# Patient Record
Sex: FEMALE | Race: WHITE | NOT HISPANIC OR LATINO
[De-identification: names, ages, dates, MRNs, and addresses within clinical notes are randomized per-mention and may not be internally consistent; named-entity substitution may affect disease eponyms.]

---

## 2017-01-04 ENCOUNTER — TRANSCRIPTION ENCOUNTER (OUTPATIENT)
Age: 57
End: 2017-01-04

## 2017-01-09 ENCOUNTER — RX RENEWAL (OUTPATIENT)
Age: 57
End: 2017-01-09

## 2017-03-27 ENCOUNTER — APPOINTMENT (OUTPATIENT)
Dept: ORTHOPEDIC SURGERY | Facility: CLINIC | Age: 57
End: 2017-03-27

## 2017-03-28 ENCOUNTER — TRANSCRIPTION ENCOUNTER (OUTPATIENT)
Age: 57
End: 2017-03-28

## 2018-05-08 ENCOUNTER — APPOINTMENT (OUTPATIENT)
Dept: INTERNAL MEDICINE | Facility: CLINIC | Age: 58
End: 2018-05-08
Payer: COMMERCIAL

## 2018-05-08 VITALS
BODY MASS INDEX: 25.44 KG/M2 | OXYGEN SATURATION: 98 % | WEIGHT: 149 LBS | HEART RATE: 74 BPM | DIASTOLIC BLOOD PRESSURE: 70 MMHG | HEIGHT: 64 IN | SYSTOLIC BLOOD PRESSURE: 114 MMHG

## 2018-05-08 DIAGNOSIS — Z86.73 PERSONAL HISTORY OF TRANSIENT ISCHEMIC ATTACK (TIA), AND CEREBRAL INFARCTION W/OUT RESIDUAL DEFICITS: ICD-10-CM

## 2018-05-08 DIAGNOSIS — D68.59 OTHER PRIMARY THROMBOPHILIA: ICD-10-CM

## 2018-05-08 DIAGNOSIS — G56.02 CARPAL TUNNEL SYNDROME, LEFT UPPER LIMB: ICD-10-CM

## 2018-05-08 DIAGNOSIS — Z87.19 PERSONAL HISTORY OF OTHER DISEASES OF THE DIGESTIVE SYSTEM: ICD-10-CM

## 2018-05-08 DIAGNOSIS — G56.01 CARPAL TUNNEL SYNDROME, RIGHT UPPER LIMB: ICD-10-CM

## 2018-05-08 DIAGNOSIS — G56.21 LESION OF ULNAR NERVE, RIGHT UPPER LIMB: ICD-10-CM

## 2018-05-08 DIAGNOSIS — G44.209 TENSION-TYPE HEADACHE, UNSPECIFIED, NOT INTRACTABLE: ICD-10-CM

## 2018-05-08 DIAGNOSIS — Z87.898 PERSONAL HISTORY OF OTHER SPECIFIED CONDITIONS: ICD-10-CM

## 2018-05-08 DIAGNOSIS — M72.0 PALMAR FASCIAL FIBROMATOSIS [DUPUYTREN]: ICD-10-CM

## 2018-05-08 PROCEDURE — 99386 PREV VISIT NEW AGE 40-64: CPT

## 2018-05-08 RX ORDER — ALPRAZOLAM 0.5 MG/1
0.5 TABLET ORAL
Refills: 0 | Status: ACTIVE | COMMUNITY

## 2018-05-08 RX ORDER — SERTRALINE HYDROCHLORIDE 100 MG/1
100 TABLET, FILM COATED ORAL
Qty: 30 | Refills: 0 | Status: DISCONTINUED | COMMUNITY
Start: 2017-12-12 | End: 2018-05-08

## 2018-05-30 LAB — HEMOCCULT STL QL IA: NEGATIVE

## 2018-07-09 ENCOUNTER — APPOINTMENT (OUTPATIENT)
Dept: ENDOCRINOLOGY | Facility: CLINIC | Age: 58
End: 2018-07-09
Payer: COMMERCIAL

## 2018-07-09 VITALS — SYSTOLIC BLOOD PRESSURE: 114 MMHG | DIASTOLIC BLOOD PRESSURE: 72 MMHG | HEART RATE: 73 BPM | OXYGEN SATURATION: 98 %

## 2018-07-09 VITALS — WEIGHT: 145 LBS | BODY MASS INDEX: 24.75 KG/M2 | HEIGHT: 64 IN

## 2018-07-09 PROCEDURE — 99245 OFF/OP CONSLTJ NEW/EST HI 55: CPT

## 2018-07-25 ENCOUNTER — RX RENEWAL (OUTPATIENT)
Age: 58
End: 2018-07-25

## 2018-07-25 ENCOUNTER — OTHER (OUTPATIENT)
Age: 58
End: 2018-07-25

## 2018-08-09 ENCOUNTER — APPOINTMENT (OUTPATIENT)
Dept: INTERNAL MEDICINE | Facility: CLINIC | Age: 58
End: 2018-08-09
Payer: COMMERCIAL

## 2018-08-09 VITALS
BODY MASS INDEX: 25.61 KG/M2 | HEART RATE: 74 BPM | SYSTOLIC BLOOD PRESSURE: 110 MMHG | WEIGHT: 150 LBS | OXYGEN SATURATION: 98 % | HEIGHT: 64 IN | DIASTOLIC BLOOD PRESSURE: 66 MMHG

## 2018-08-09 PROCEDURE — 99214 OFFICE O/P EST MOD 30 MIN: CPT

## 2018-08-09 RX ORDER — ATORVASTATIN CALCIUM 40 MG/1
40 TABLET, FILM COATED ORAL
Refills: 0 | Status: ACTIVE | COMMUNITY

## 2018-08-09 RX ORDER — SERTRALINE HYDROCHLORIDE 25 MG/1
TABLET, FILM COATED ORAL
Refills: 0 | Status: DISCONTINUED | COMMUNITY
End: 2018-08-09

## 2018-08-09 RX ORDER — ATORVASTATIN CALCIUM 20 MG/1
20 TABLET, FILM COATED ORAL
Qty: 90 | Refills: 0 | Status: DISCONTINUED | COMMUNITY
Start: 2018-03-27 | End: 2018-08-09

## 2018-08-09 RX ORDER — FLUDROCORTISONE ACETATE 0.1 MG
TABLET ORAL
Refills: 0 | Status: DISCONTINUED | COMMUNITY
End: 2018-08-09

## 2018-08-09 NOTE — ASSESSMENT
[FreeTextEntry1] : \par 58 year old woman with VHL, adrenal insufficiency, hypothyroidism presents for follow-up.\par \par 1. Adrenal insufficiency: on Florinef and Cortef; f/u with Endo as well\par \par 2. Hypothyroidism: TSH went down from 5.5 to 2.33 on outside labwork after increasing dose of Levothyroxine from 112 to 125 daily; f/u with endo as well\par \par 3. HCM: mammo referral given; pt has GYN appt coming up for PAP/pelvic exams; FIT kit given for colon ca screening previous visit

## 2018-08-09 NOTE — HISTORY OF PRESENT ILLNESS
[FreeTextEntry1] : follow-up for hypothyroidism [de-identified] : Presents for follow-up of hypothyroidism. Had increased dose of Levothyroxine to 125 since last visit, she reports feeling well, taking her medications everyday. Following now with Endo in-system as well. Had bloodwork drawn recently, scanned into chart, TSH improved, now 2.33.

## 2018-08-09 NOTE — PHYSICAL EXAM
[No Acute Distress] : no acute distress [Well Nourished] : well nourished [Well Developed] : well developed [Well-Appearing] : well-appearing [Normal Sclera/Conjunctiva] : normal sclera/conjunctiva [Supple] : supple [No Respiratory Distress] : no respiratory distress  [Clear to Auscultation] : lungs were clear to auscultation bilaterally [No Accessory Muscle Use] : no accessory muscle use [Normal Rate] : normal rate  [Regular Rhythm] : with a regular rhythm [Normal S1, S2] : normal S1 and S2 [No Murmur] : no murmur heard [No Carotid Bruits] : no carotid bruits [No Abdominal Bruit] : a ~M bruit was not heard ~T in the abdomen [No Varicosities] : no varicosities [Pedal Pulses Present] : the pedal pulses are present [No Edema] : there was no peripheral edema [No Extremity Clubbing/Cyanosis] : no extremity clubbing/cyanosis [No Palpable Aorta] : no palpable aorta [Soft] : abdomen soft [Non Tender] : non-tender [Non-distended] : non-distended [No Masses] : no abdominal mass palpated [No HSM] : no HSM [Normal Bowel Sounds] : normal bowel sounds [No Joint Swelling] : no joint swelling [Grossly Normal Strength/Tone] : grossly normal strength/tone [No Rash] : no rash [Normal Gait] : normal gait [Normal Affect] : the affect was normal [Normal Insight/Judgement] : insight and judgment were intact

## 2018-08-10 ENCOUNTER — OTHER (OUTPATIENT)
Age: 58
End: 2018-08-10

## 2018-08-24 ENCOUNTER — MEDICATION RENEWAL (OUTPATIENT)
Age: 58
End: 2018-08-24

## 2018-09-28 ENCOUNTER — APPOINTMENT (OUTPATIENT)
Dept: ENDOCRINOLOGY | Facility: CLINIC | Age: 58
End: 2018-09-28

## 2018-10-05 ENCOUNTER — APPOINTMENT (OUTPATIENT)
Dept: ENDOCRINOLOGY | Facility: CLINIC | Age: 58
End: 2018-10-05
Payer: COMMERCIAL

## 2018-10-05 PROCEDURE — 90686 IIV4 VACC NO PRSV 0.5 ML IM: CPT

## 2018-10-05 PROCEDURE — 96372 THER/PROPH/DIAG INJ SC/IM: CPT

## 2018-10-05 PROCEDURE — G0008: CPT

## 2018-10-05 RX ORDER — DENOSUMAB 60 MG/ML
60 INJECTION SUBCUTANEOUS
Qty: 0 | Refills: 0 | Status: COMPLETED | OUTPATIENT
Start: 2018-10-05

## 2018-10-05 RX ADMIN — DENOSUMAB 0 MG/ML: 60 INJECTION SUBCUTANEOUS at 00:00

## 2018-10-15 ENCOUNTER — RX RENEWAL (OUTPATIENT)
Age: 58
End: 2018-10-15

## 2019-01-08 ENCOUNTER — TRANSCRIPTION ENCOUNTER (OUTPATIENT)
Age: 59
End: 2019-01-08

## 2019-01-08 ENCOUNTER — MEDICATION RENEWAL (OUTPATIENT)
Age: 59
End: 2019-01-08

## 2019-01-08 ENCOUNTER — OTHER (OUTPATIENT)
Age: 59
End: 2019-01-08

## 2019-01-28 ENCOUNTER — APPOINTMENT (OUTPATIENT)
Dept: ENDOCRINOLOGY | Facility: CLINIC | Age: 59
End: 2019-01-28
Payer: COMMERCIAL

## 2019-01-28 VITALS
BODY MASS INDEX: 25.95 KG/M2 | SYSTOLIC BLOOD PRESSURE: 110 MMHG | WEIGHT: 152 LBS | DIASTOLIC BLOOD PRESSURE: 70 MMHG | HEART RATE: 74 BPM | HEIGHT: 64 IN | OXYGEN SATURATION: 97 %

## 2019-01-28 PROCEDURE — 99215 OFFICE O/P EST HI 40 MIN: CPT

## 2019-01-29 NOTE — ASSESSMENT
[FreeTextEntry1] : 58 year old female with history of VHL (pheochromocytoma) s/p B/L adrenalectomy, thyroid cancer s/p total thyroidectomy, hypertension, osteopenia who presented for evaluation. \par \par VHL S/p pheochromocytoma with B/L adrenalectomy \par -Advised to increase hydrocortisone to 15-10 mg daily \par -will need yearly DEXA scans since she is being managed on steroids long term ( next one scheduled on 04/2018)\par -Eye/retinal examination with indirect ophthalmoscopy using a dilated examination yearly\par -Ultrasound and MRI scan of the abdomen with and without contrast to assess the kidneys, pancreas, and adrenals at least yearly\par -Once every 2 years will need MRI of the brain and audiology evaluation ( audiology referral provided) \par \par \par Thyroid cancer\par -Reported as micropapillary thyroid cancer, but will obtain the original pathology report\par -At this time will check thyroglobulin, thyroglobulin ab and TFTs \par -Further management as per results, will be called back \par -Continue levothyroxine at current dose for now, discussed cytomel with her but will hold off for now \par \par Osteopenia\par -She has been receiving prolia injections\par -Next visit she can have a DEXA scan prior to it and will be given the prolia injection as well\par -Next DEXA in 04/2018 and next prolia in 04/2018 \par \par Follow up in 6 months

## 2019-01-29 NOTE — PHYSICAL EXAM
[Alert] : alert [No Acute Distress] : no acute distress [Well Nourished] : well nourished [Well Developed] : well developed [Normal Sclera/Conjunctiva] : normal sclera/conjunctiva [PERRL] : pupils equal, round and reactive to light [EOMI] : extra ocular movement intact [No Proptosis] : no proptosis [Normal Outer Ear/Nose] : the ears and nose were normal in appearance [Normal TMs] : both tympanic membranes were normal [No Neck Mass] : no neck mass was observed [Supple] : the neck was supple [Thyroid Not Enlarged] : the thyroid was not enlarged [No Respiratory Distress] : no respiratory distress [Normal Rate and Effort] : normal respiratory rhythm and effort [No Accessory Muscle Use] : no accessory muscle use [Clear to Auscultation] : lungs were clear to auscultation bilaterally [Normal Rate] : heart rate was normal  [Normal S1, S2] : normal S1 and S2 [Regular Rhythm] : with a regular rhythm [Normal Bowel Sounds] : normal bowel sounds [Not Tender] : non-tender [Soft] : abdomen soft [No CVA Tenderness] : no ~M costovertebral angle tenderness [Kyphosis] : no kyphosis present [Scoliosis] : scoliosis not present [Normal Gait] : normal gait [No Joint Swelling] : no joint swelling seen [No Clubbing, Cyanosis] : no clubbing  or cyanosis of the fingernails [Normal Strength/Tone] : muscle strength and tone were normal [No Rash] : no rash [Foot Ulcers] : no foot ulcers [Normal Reflexes] : deep tendon reflexes were 2+ and symmetric [No Motor Deficits] : the motor exam was normal [No Tremors] : no tremors [Oriented x3] : oriented to person, place, and time [Normal Insight/Judgement] : insight and judgment were intact [Normal Affect] : the affect was normal [Normal Mood] : the mood was normal

## 2019-01-29 NOTE — HISTORY OF PRESENT ILLNESS
[FreeTextEntry1] : Pheochromocytoma removal 1969, 1970 and 1977 and at that point had bilateral adrenal removal. \par Since then she has been on cortisone acetate 25 mg 1/2 in the morning and 1/2 in the afternoon and florinef 0.05 mg once a day.\par These doses have not been adjusted since the 1970s. \par History of VHL in the family. Her mother and her brother and her two nieces have it as well. \par \par History of thyroid cancer (total thyroidectomy in 2013): Surgery was done at [facility]. She never had AVILA. She thinks it was papillary thyroid cancer. \par She was being followed by her PCP. She does not remember when she had her last thyroid ultrasound. \par She was noted to have an elevated TSH and then increased from levothyroxine 112 to 125 mcg. \par She has been having fatigue. \par She has gained 15 lbs in the last year. \par \par She has history of osteopenia and has been treated with Prolia. Last prolia injection was in 03/2018 \par She reported that she took actonel. \par Her last bone density was a year ago. Will be sending the reports in to us. \par  ---------------------------------------------------------------------------------------------------------\par In the interim, she reported that she will be having elbow surgery. \par She was concerned about steroid dosing \par Extreme fatigue throughout the day, mid day loses energy \par \par

## 2019-01-29 NOTE — REVIEW OF SYSTEMS
[Fatigue] : fatigue [Decreased Appetite] : appetite not decreased [Recent Weight Gain (___ Lbs)] : no recent weight gain [Recent Weight Loss (___ Lbs)] : no recent weight loss [Visual Field Defect] : no visual field defect [Blurry Vision] : no blurred vision [Dry Eyes] : no dryness of the eyes [Eyes Itch] : no itching of the eyes [Dysphagia] : no dysphagia [Dysphonia] : no dysphonia [Neck Pain] : no neck pain [Nasal Congestion] : no nasal congestion [Chest Pain] : no chest pain [Palpitations] : no palpitations [Heart Rate Is Slow] : the heart rate was not slow [Heart Rate Is Fast] : the heart rate was not fast [Shortness Of Breath] : no shortness of breath [Wheezing] : no wheezing was heard [Cough] : no cough [SOB on Exertion] : no shortness of breath during exertion [Nausea] : no nausea [Vomiting] : no vomiting was observed [Constipation] : no constipation [Diarrhea] : no diarrhea [Polyuria] : no polyuria [Irregular Menses] : regular menses [Joint Pain] : no joint pain [Joint Stiffness] : no joint stiffness [Muscle Weakness] : no muscle weakness [Muscle Cramps] : no muscle cramps [Acanthosis] : no acanthosis  [Hirsutism] : no hirsutism [Acne] : no acne [Hair Loss] : no hair loss [Headache] : no headaches [Tremors] : no tremors [Dizziness] : no dizziness [Pain/Numbness of Digits] : no pain/numbness of digits [Depression] : no depression [Anxiety] : no anxiety [Polydipsia] : no polydipsia [Galactorrhea] : no galactorrhea  [Cold Intolerance] : cold tolerant [Heat Intolerance] : heat tolerant [Easy Bleeding] : no ~M tendency for easy bleeding [Easy Bruising] : no tendency for easy bruising [Swelling] : no swelling

## 2019-01-30 ENCOUNTER — OTHER (OUTPATIENT)
Age: 59
End: 2019-01-30

## 2019-01-30 LAB
T3 SERPL-MCNC: 60 NG/DL
T4 FREE SERPL-MCNC: 1.5 NG/DL
THYROGLOB AB SERPL-ACNC: <20 IU/ML
THYROGLOB SERPL-MCNC: <0.2 NG/ML
TSH SERPL-ACNC: 0.26 UIU/ML

## 2019-02-04 ENCOUNTER — TRANSCRIPTION ENCOUNTER (OUTPATIENT)
Age: 59
End: 2019-02-04

## 2019-02-14 ENCOUNTER — EMERGENCY (EMERGENCY)
Facility: HOSPITAL | Age: 59
LOS: 1 days | Discharge: HOME | End: 2019-02-14
Attending: EMERGENCY MEDICINE
Payer: COMMERCIAL

## 2019-02-14 VITALS
SYSTOLIC BLOOD PRESSURE: 147 MMHG | OXYGEN SATURATION: 100 % | TEMPERATURE: 99 F | HEART RATE: 72 BPM | RESPIRATION RATE: 19 BRPM | DIASTOLIC BLOOD PRESSURE: 86 MMHG

## 2019-02-14 VITALS — WEIGHT: 149.91 LBS | HEIGHT: 63.5 IN

## 2019-02-14 PROCEDURE — 73502 X-RAY EXAM HIP UNI 2-3 VIEWS: CPT

## 2019-02-14 PROCEDURE — 73090 X-RAY EXAM OF FOREARM: CPT

## 2019-02-14 PROCEDURE — 70450 CT HEAD/BRAIN W/O DYE: CPT

## 2019-02-14 PROCEDURE — 99284 EMERGENCY DEPT VISIT MOD MDM: CPT | Mod: 25

## 2019-02-14 PROCEDURE — 73502 X-RAY EXAM HIP UNI 2-3 VIEWS: CPT | Mod: 26,LT

## 2019-02-14 PROCEDURE — 70450 CT HEAD/BRAIN W/O DYE: CPT | Mod: 26

## 2019-02-14 PROCEDURE — 73110 X-RAY EXAM OF WRIST: CPT | Mod: 26,LT

## 2019-02-14 PROCEDURE — 29125 APPL SHORT ARM SPLINT STATIC: CPT

## 2019-02-14 PROCEDURE — 73090 X-RAY EXAM OF FOREARM: CPT | Mod: 26,LT

## 2019-02-14 PROCEDURE — 29125 APPL SHORT ARM SPLINT STATIC: CPT | Mod: LT

## 2019-02-14 PROCEDURE — 73070 X-RAY EXAM OF ELBOW: CPT

## 2019-02-14 PROCEDURE — 73070 X-RAY EXAM OF ELBOW: CPT | Mod: 26,LT

## 2019-02-14 PROCEDURE — 73110 X-RAY EXAM OF WRIST: CPT

## 2019-02-14 RX ORDER — ACETAMINOPHEN 500 MG
975 TABLET ORAL ONCE
Qty: 0 | Refills: 0 | Status: COMPLETED | OUTPATIENT
Start: 2019-02-14 | End: 2019-02-14

## 2019-02-14 RX ADMIN — Medication 975 MILLIGRAM(S): at 14:58

## 2019-02-14 NOTE — ED PROVIDER NOTE - CARE PLAN
Principal Discharge DX:	Closed fracture of distal end of left radius, unspecified fracture morphology, initial encounter  Assessment and plan of treatment:	1. Follow up with your PMD in 1-2 days. Additionally please follow up with orthopedist Dr. Cline, or one of the other orthopedic providers on the ortho referral sheet within the next 2-3 days for further evaluation/management regarding your fracture.  2. Rest, stay hydrated. Continue all your current home medications as previously prescribed. Take Tylenol 650mg 1 tab every 4-6 hours as needed for pain. Additionally you may take Motrin 600 mg every 8 hours with food.  3. Rest, ice, and elevate the injured extremity. Keep splint in place and dry. Do not remove until you are evaluated by an orthopedist.  4. Return to the ED immediately if you develop any new/worsening symptoms including persistent headache, nausea/vomiting, speech/visual changes, swelling/increasing pain to wrist, numbness/tingling, difficulty walking or any other concerning symptoms.  Secondary Diagnosis:	Fall, initial encounter

## 2019-02-14 NOTE — ED PROVIDER NOTE - OBJECTIVE STATEMENT
57 yo female PMhx adrenal insufficiency, hypothyroidism, anxiety, depression presents to the ED c/o left sided body pain and head pain s/p mechanical fall at work around 10AM. Was leaning on chair with right foot and when she went to take her right foot off it got caught, causing her to lose her balance and fall backwards onto the floor, hitting her L hip/buttock, and landing on left arm with resultant pain. Does not believe she hit her head but has had headache since. Was helped up by coworkers, took 3 Motrin and came to ED. She is ambulatory. Endorses intermittent dizziness since fall. Denies cp, sob, weakness, numbness/tingling, midline back pain, n/v, abdominal pain, speech/visual changes, gait abnormality, bowel/bladder incontinence. On daily aspirin. 59 yo female PMhx adrenal insufficiency, hypothyroidism, anxiety, depression presents to the ED c/o left sided body pain and head pain s/p mechanical fall at work around 10AM. Was leaning on chair with right foot and when she went to take her right foot off it got caught, causing her to lose her balance and fall backwards onto the floor, hitting her L hip/buttock, and landing on left arm with resultant pain. Does not believe she hit her head but has had headache since. Was helped up by coworkers, took 3 Motrin and came to ED. She is ambulatory. Endorses intermittent dizziness since fall and "being in a fog". Denies cp, sob, weakness, numbness/tingling, midline back pain, n/v, abdominal pain, speech/visual changes, gait abnormality, bowel/bladder incontinence. On daily aspirin.

## 2019-02-14 NOTE — ED ADULT NURSE REASSESSMENT NOTE - NS ED NURSE REASSESS COMMENT FT1
Pt reports that her pain is 7/10 on pain scale in her left wrist. Pt treated with Tylenol for her pain. Will continue to monitor pt.

## 2019-02-14 NOTE — ED PROCEDURE NOTE - CPROC ED POST PROC CARE GUIDE1
Instructed patient/caregiver regarding signs and symptoms of infection./Elevate the injured extremity as instructed./Keep the cast/splint/dressing clean and dry./Verbal/written post procedure instructions were given to patient/caregiver./Instructed patient/caregiver to follow-up with primary care physician.
Verbal/written post procedure instructions were given to patient/caregiver./Keep the cast/splint/dressing clean and dry./Instructed patient/caregiver to follow-up with primary care physician./Instructed patient/caregiver regarding signs and symptoms of infection./Elevate the injured extremity as instructed.

## 2019-02-14 NOTE — ED PROVIDER NOTE - PLAN OF CARE
1. Follow up with your PMD in 1-2 days. Additionally please follow up with orthopedist Dr. Cline, or one of the other orthopedic providers on the ortho referral sheet within the next 2-3 days for further evaluation/management regarding your fracture.  2. Rest, stay hydrated. Continue all your current home medications as previously prescribed. Take Tylenol 650mg 1 tab every 4-6 hours as needed for pain. Additionally you may take Motrin 600 mg every 8 hours with food.  3. Rest, ice, and elevate the injured extremity. Keep splint in place and dry. Do not remove until you are evaluated by an orthopedist.  4. Return to the ED immediately if you develop any new/worsening symptoms including persistent headache, nausea/vomiting, speech/visual changes, swelling/increasing pain to wrist, numbness/tingling, difficulty walking or any other concerning symptoms.

## 2019-02-14 NOTE — ED PROCEDURE NOTE - NS ED PERI VASCULAR NEG
no paresthesia/no cyanosis of extremity/capillary refill time < 2 seconds/fingers/toes warm to touch/no swelling
capillary refill time < 2 seconds/no cyanosis of extremity/fingers/toes warm to touch/no swelling/no paresthesia

## 2019-02-14 NOTE — ED PROVIDER NOTE - CLINICAL SUMMARY MEDICAL DECISION MAKING FREE TEXT BOX
58F p/w left body pain and headache after fall; mechanical nature; tenderness in the left wrist, let wrist deformity; mild hip tenderness, no hip deformity, ambulatory; plan: CT head to eval for ICH/skull fracture, wrist x-rays of wrist, hip to eval for fracture.

## 2019-02-14 NOTE — ED PROVIDER NOTE - ATTENDING CONTRIBUTION TO CARE
58F p/w left body pain and headache after fall; mechanical nature; tenderness in the left wrist, let wrist deformity; mild hip tenderness, no hip deformity, ambulatory; plan: CT head to eval for ICH/skull fracture, wrist x-rays of wrist, hip to eval for fracture.    ED Course: Improving with medication; imaging revealed distal radius fracture minimal displacement; splinted, dc with ortho f/u.

## 2019-02-14 NOTE — ED ADULT NURSE NOTE - OBJECTIVE STATEMENT
59 y/o F, A&OX4, enters ED s/p mechanical trip & fall at 10 AM. Pt. reports she was at work, lost balance, fell and landed on L. side. Pt. reports L. wrist pain, L. buttock pain and back/neck pain. Pt. on ASA- no other anticoagulation. Pt. unsure if she lost consciousness, but reports she was able to ambulate post fall. No n/v. Pt. does endorse headache. No numbness/tingling. Positive strength and sensation to all extremities. Pupils 4 mm in size, PERRL. Pt. denies dizziness, but reports "fogginess." Pt. took Motrin at 10 AM. Pt. has full ROM in L. arm, some minor swelling noted to L. wrist. Pt. able to wiggle fingers. Radial pulse present. Skin warm, dry and intact. Safety and comfort provided. Family at bedside.

## 2019-02-14 NOTE — ED PROVIDER NOTE - PROGRESS NOTE DETAILS
Distal radius fx noted on xray. Paged ortho for consult re stabilizing. Attending aware. - Chris Wong PA-C Distal radius fx noted on xray. Paged ortho to discuss re stabilizing. Attending aware. - Chris Wong PA-C Spoke w/ ortho resident who reviewed the xrays, felt wrist did not need to be reduced, advised to apply sugartong splint and have patient f/u as outpatient. Will splint and have pt f/u with Dr. Cline. - ARRON MurciaC sugartong splint applied to right arm for stabilization of distal radius fracture. Post-neurovascular exam intact. Aircast applied to left ankle for symptomatic support. Attending aware. Pt stable for d/c. - Chris Wong PA-C

## 2019-02-14 NOTE — ED PROVIDER NOTE - LOWER EXTREMITY EXAM, LEFT
No obvious deformity, IR/ER, or shortening of the leg/hip. No bruising or swelling. +ttp on lateral aspect of left hip over greater trochanter. ROM at hip limited 2/2 pain. Full AROM knee and ankle w/ 5/5 strength.

## 2019-02-14 NOTE — ED PROVIDER NOTE - ENMT, MLM
Airway patent, Nasal mucosa clear. Mouth with normal mucosa. Throat has no vesicles, no oropharyngeal exudates and uvula is midline. No raccoon eyes, no nova signs. No pre or posterior auricular tenderness. No hemotympanum.

## 2019-02-14 NOTE — ED PROVIDER NOTE - CARE PROVIDER_API CALL
Hernan Cline)  Orthopedics  [address], [address]  [place] [address]  Phone: [phone]  Fax: [phone]  Follow Up Time:

## 2019-02-14 NOTE — ED PROVIDER NOTE - UPPER EXTREMITY EXAM, LEFT
+minimal swelling noted to left wrist. +ttp noted to lateral epicondyle and lateral proximal radius. +diffuse ttp left wrist. Limited ROM at wrist and elbow 2/2 reported pain. +pain w/ supination. No tenderness of shoulder joint. Full AROM shoulder w/ 5/5 strength. No bony hand tenderness. Sensation intact.

## 2019-02-14 NOTE — ED PROCEDURE NOTE - ATTENDING CONTRIBUTION TO CARE
I have participated in and supervised all key portions of the above procedures and agree with the above documentation. KELSEY Galaviz MD
I have participated in and supervised all key portions of the above procedures and agree with the above documentation. KELSEY Galaviz MD

## 2019-03-01 ENCOUNTER — APPOINTMENT (OUTPATIENT)
Dept: SPEECH THERAPY | Facility: CLINIC | Age: 59
End: 2019-03-01

## 2019-03-03 ENCOUNTER — TRANSCRIPTION ENCOUNTER (OUTPATIENT)
Age: 59
End: 2019-03-03

## 2019-03-06 ENCOUNTER — APPOINTMENT (OUTPATIENT)
Dept: INTERNAL MEDICINE | Facility: CLINIC | Age: 59
End: 2019-03-06
Payer: OTHER MISCELLANEOUS

## 2019-03-06 VITALS
HEART RATE: 71 BPM | SYSTOLIC BLOOD PRESSURE: 114 MMHG | BODY MASS INDEX: 26.63 KG/M2 | WEIGHT: 156 LBS | TEMPERATURE: 98.6 F | HEIGHT: 64 IN | OXYGEN SATURATION: 99 % | DIASTOLIC BLOOD PRESSURE: 82 MMHG

## 2019-03-06 DIAGNOSIS — Z13.39 ENCOUNTER FOR SCREENING EXAM FOR OTHER MENTAL HEALTH AND BEHAVIORAL DISORDERS: ICD-10-CM

## 2019-03-06 DIAGNOSIS — Z13.31 ENCOUNTER FOR SCREENING FOR DEPRESSION: ICD-10-CM

## 2019-03-06 DIAGNOSIS — S09.90XA UNSPECIFIED INJURY OF HEAD, INITIAL ENCOUNTER: ICD-10-CM

## 2019-03-06 PROCEDURE — G0444 DEPRESSION SCREEN ANNUAL: CPT

## 2019-03-06 PROCEDURE — G0442 ANNUAL ALCOHOL SCREEN 15 MIN: CPT

## 2019-03-06 PROCEDURE — 99214 OFFICE O/P EST MOD 30 MIN: CPT

## 2019-03-06 RX ORDER — LEVOTHYROXINE SODIUM 0.12 MG/1
125 TABLET ORAL
Qty: 90 | Refills: 3 | Status: DISCONTINUED | COMMUNITY
Start: 2018-05-08 | End: 2019-03-06

## 2019-03-06 NOTE — HEALTH RISK ASSESSMENT
[Intercurrent ED visits] : went to ED [] : No [Any fall with injury in past year] : Patient reported fall with injury in the past year [0] : 2) Feeling down, depressed, or hopeless: Not at all (0) [[id number]] : 0

## 2019-03-06 NOTE — HISTORY OF PRESENT ILLNESS
[FreeTextEntry1] : Presents for follow-up of head injury. [de-identified] : Patient was at [facility] ED on 2/14/19 after a mechanical fall while at work where she hit her head and fractured her left distal radius. Head CT imaging at that time showed no acute pathology but recommended short-interval follow-up. X-ray of the left radius showed a distal fracture for which a splint was placed. She has since followed up with her Orthopedist who placed a cast. She has appt for follow-up with her Orthopedist on 3/15/19. She continues to feel intermittent "dizziness" and "discomfort" over the top of her head. She denies focal weakness and paresthesias. No vomiting but she has had intermittent nausea. No fevers or chills.

## 2019-03-06 NOTE — PHYSICAL EXAM

## 2019-03-06 NOTE — ASSESSMENT
[FreeTextEntry1] : \par Head trauma: repeat CT head for short-interval follow-up based on last CT head result from [facility] ED; ordered CT head today\par \par Radial fracture: coordinated follow-up care with Ortho for reassessment\par \par Depression screen performed today, PHQ2=0\par \par Annual alcohol misuse screen, 15 mins, done; negative

## 2019-03-12 ENCOUNTER — APPOINTMENT (OUTPATIENT)
Dept: OTOLARYNGOLOGY | Facility: CLINIC | Age: 59
End: 2019-03-12
Payer: COMMERCIAL

## 2019-03-12 VITALS
BODY MASS INDEX: 26.58 KG/M2 | SYSTOLIC BLOOD PRESSURE: 115 MMHG | HEART RATE: 74 BPM | TEMPERATURE: 98.4 F | DIASTOLIC BLOOD PRESSURE: 73 MMHG | HEIGHT: 63 IN | OXYGEN SATURATION: 99 % | RESPIRATION RATE: 17 BRPM | WEIGHT: 150 LBS

## 2019-03-12 DIAGNOSIS — H91.90 UNSPECIFIED HEARING LOSS, UNSPECIFIED EAR: ICD-10-CM

## 2019-03-12 DIAGNOSIS — Z86.39 PERSONAL HISTORY OF OTHER ENDOCRINE, NUTRITIONAL AND METABOLIC DISEASE: ICD-10-CM

## 2019-03-12 DIAGNOSIS — Z80.9 FAMILY HISTORY OF MALIGNANT NEOPLASM, UNSPECIFIED: ICD-10-CM

## 2019-03-12 DIAGNOSIS — H90.3 SENSORINEURAL HEARING LOSS, BILATERAL: ICD-10-CM

## 2019-03-12 DIAGNOSIS — H61.23 IMPACTED CERUMEN, BILATERAL: ICD-10-CM

## 2019-03-12 DIAGNOSIS — Z86.73 PERSONAL HISTORY OF TRANSIENT ISCHEMIC ATTACK (TIA), AND CEREBRAL INFARCTION W/OUT RESIDUAL DEFICITS: ICD-10-CM

## 2019-03-12 DIAGNOSIS — Z86.79 PERSONAL HISTORY OF OTHER DISEASES OF THE CIRCULATORY SYSTEM: ICD-10-CM

## 2019-03-12 DIAGNOSIS — H61.20 IMPACTED CERUMEN, UNSPECIFIED EAR: ICD-10-CM

## 2019-03-12 PROCEDURE — 92557 COMPREHENSIVE HEARING TEST: CPT

## 2019-03-12 PROCEDURE — 99204 OFFICE O/P NEW MOD 45 MIN: CPT | Mod: 25

## 2019-03-12 PROCEDURE — 92550 TYMPANOMETRY & REFLEX THRESH: CPT

## 2019-03-12 PROCEDURE — 69210 REMOVE IMPACTED EAR WAX UNI: CPT

## 2019-03-12 NOTE — PROCEDURE
[Cerumen Impaction] : Cerumen Impaction [Same] : same as the Pre Op Dx. [] : Removal of Cerumen [FreeTextEntry6] : b copoiusc erumen imapction rmeoved atrauamticallyu with suciton

## 2019-03-12 NOTE — HISTORY OF PRESENT ILLNESS
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 It is slowly progressive;. Denies noise exposure or fh hl at a young age. She is here because she has been told vhl is associated with b hl and wanted to check her hearing. She denies tinnitu or vertigo and gfeels her hl is mild. no fh relevant to cc.

## 2019-03-12 NOTE — PHYSICAL EXAM
[de-identified] : b copious ceurmen imapction rmoeved with suction [Normal] : orientation to person, place, and time: normal

## 2019-03-13 ENCOUNTER — FORM ENCOUNTER (OUTPATIENT)
Age: 59
End: 2019-03-13

## 2019-03-14 ENCOUNTER — APPOINTMENT (OUTPATIENT)
Dept: CT IMAGING | Facility: CLINIC | Age: 59
End: 2019-03-14
Payer: OTHER MISCELLANEOUS

## 2019-03-14 ENCOUNTER — OUTPATIENT (OUTPATIENT)
Dept: OUTPATIENT SERVICES | Facility: HOSPITAL | Age: 59
LOS: 1 days | End: 2019-03-14
Payer: COMMERCIAL

## 2019-03-14 DIAGNOSIS — S09.90XA UNSPECIFIED INJURY OF HEAD, INITIAL ENCOUNTER: ICD-10-CM

## 2019-03-14 PROCEDURE — 70450 CT HEAD/BRAIN W/O DYE: CPT | Mod: 26

## 2019-03-14 PROCEDURE — 70450 CT HEAD/BRAIN W/O DYE: CPT

## 2019-03-20 ENCOUNTER — MEDICATION RENEWAL (OUTPATIENT)
Age: 59
End: 2019-03-20

## 2019-03-22 ENCOUNTER — APPOINTMENT (OUTPATIENT)
Dept: INTERNAL MEDICINE | Facility: CLINIC | Age: 59
End: 2019-03-22

## 2019-05-03 ENCOUNTER — APPOINTMENT (OUTPATIENT)
Dept: ENDOCRINOLOGY | Facility: CLINIC | Age: 59
End: 2019-05-03
Payer: COMMERCIAL

## 2019-05-03 VITALS — DIASTOLIC BLOOD PRESSURE: 70 MMHG | HEART RATE: 73 BPM | OXYGEN SATURATION: 98 % | SYSTOLIC BLOOD PRESSURE: 120 MMHG

## 2019-05-03 VITALS — HEIGHT: 63 IN | WEIGHT: 153 LBS | BODY MASS INDEX: 27.11 KG/M2

## 2019-05-03 PROCEDURE — 99215 OFFICE O/P EST HI 40 MIN: CPT

## 2019-05-03 PROCEDURE — ZZZZZ: CPT

## 2019-05-03 PROCEDURE — 77085 DXA BONE DENSITY AXL VRT FX: CPT

## 2019-05-03 NOTE — PHYSICAL EXAM
[Alert] : alert [No Acute Distress] : no acute distress [Well Nourished] : well nourished [Well Developed] : well developed [Normal Sclera/Conjunctiva] : normal sclera/conjunctiva [EOMI] : extra ocular movement intact [No Proptosis] : no proptosis [PERRL] : pupils equal, round and reactive to light [Normal Outer Ear/Nose] : the ears and nose were normal in appearance [Normal TMs] : both tympanic membranes were normal [No Neck Mass] : no neck mass was observed [No Respiratory Distress] : no respiratory distress [Supple] : the neck was supple [Normal Rate and Effort] : normal respiratory rhythm and effort [Normal Rate] : heart rate was normal  [Normal S1, S2] : normal S1 and S2 [Normal Bowel Sounds] : normal bowel sounds [Soft] : abdomen soft [Not Tender] : non-tender [No CVA Tenderness] : no ~M costovertebral angle tenderness [Kyphosis] : no kyphosis present [Normal Gait] : normal gait [No Clubbing, Cyanosis] : no clubbing  or cyanosis of the fingernails [No Joint Swelling] : no joint swelling seen [No Rash] : no rash [No Skin Lesions] : no skin lesions [Foot Ulcers] : no foot ulcers [Acne] : no acne [Normal Reflexes] : deep tendon reflexes were 2+ and symmetric [No Tremors] : no tremors [No Motor Deficits] : the motor exam was normal [Normal Insight/Judgement] : insight and judgment were intact [Oriented x3] : oriented to person, place, and time [Normal Affect] : the affect was normal [Normal Mood] : the mood was normal

## 2019-05-03 NOTE — HISTORY OF PRESENT ILLNESS
[FreeTextEntry1] : Pheochromocytoma removal 1969, 1970 and 1977 and at that point had bilateral adrenal removal. \par Since then she has been on cortisone acetate 25 mg 1/2 in the morning and 1/2 in the afternoon and florinef 0.05 mg once a day.\par These doses have not been adjusted since the 1970s. \par History of VHL in the family. Her mother and her brother and her two nieces have it as well. \par \par History of thyroid cancer (total thyroidectomy in 2013): Surgery was done at [facility]. She never had AVILA. She thinks it was papillary thyroid cancer. \par She was being followed by her PCP. She does not remember when she had her last thyroid ultrasound. \par She was noted to have an elevated TSH and then increased from levothyroxine 112 to 125 mcg. \par She has been having fatigue. \par She has gained 15 lbs in the last year. \par \par She has history of osteopenia and has been treated with Prolia. Last prolia injection was in 03/2018 \par She reported that she took actonel. \par Her last bone density was a year ago. Will be sending the reports in to us. \par  ---------------------------------------------------------------------------------------------------------\par In the interim, she reported that she will be having elbow surgery but that got delayed, she did however have a radius fracture recently after a fall. \par She has decreased her hydrocortisone to 10-10 mg \par Extreme fatigue throughout the day, mid day loses energy however has not changed much\par

## 2019-05-03 NOTE — ASSESSMENT
[FreeTextEntry1] : 58 year old female with history of VHL (pheochromocytoma) s/p B/L adrenalectomy, thyroid cancer s/p total thyroidectomy, hypertension, osteopenia who presented for follow-up\par \par VHL S/p pheochromocytoma with B/L adrenalectomy \par -Hydrocortisone now is 10-10 mg daily \par -will need yearly DEXA scans since she is being managed on steroids long term ( next one scheduled on 04/2020)\par -Eye/retinal examination with indirect ophthalmoscopy using a dilated examination yearly\par -Ultrasound and MRI scan of the abdomen with and without contrast to assess the kidneys, pancreas, and adrenals at least yearly\par -Once every 2 years will need MRI of the brain and audiology evaluation ( audiology referral provided) \par -Recent audiology testing was showing some mild sensorineural loss \par \par Thyroid cancer\par -Reported as micropapillary thyroid cancer, but will obtain the original pathology report\par -At this time will check thyroglobulin, thyroglobulin ab \par -Most recent TSH was 0.7\par -Further management as per results, will be called back \par -Continue levothyroxine at current dose for now \par \par Osteopenia\par -She has been receiving prolia injections\par -Bone density has mild improvement in comparison to before\par -Will switch to boniva for one year and re-evaluate\par \par Follow up in 6 months. \par \par

## 2019-05-03 NOTE — REVIEW OF SYSTEMS
[Fatigue] : no fatigue [Recent Weight Gain (___ Lbs)] : no recent weight gain [Decreased Appetite] : appetite not decreased [Recent Weight Loss (___ Lbs)] : no recent weight loss [Visual Field Defect] : no visual field defect [Blurry Vision] : no blurred vision [Dry Eyes] : no dryness of the eyes [Eyes Itch] : no itching of the eyes [Dysphagia] : no dysphagia [Dysphonia] : no dysphonia [Chest Pain] : no chest pain [Neck Pain] : no neck pain [Palpitations] : no palpitations [Heart Rate Is Slow] : the heart rate was not slow [Heart Rate Is Fast] : the heart rate was not fast [Shortness Of Breath] : no shortness of breath [Wheezing] : no wheezing was heard [Nausea] : no nausea [Polyuria] : no polyuria [Vomiting] : no vomiting was observed [Irregular Menses] : regular menses [Joint Pain] : no joint pain [Joint Stiffness] : no joint stiffness [Muscle Weakness] : no muscle weakness [Acanthosis] : no acanthosis  [Hirsutism] : no hirsutism [Acne] : no acne [Headache] : no headaches [Tremors] : no tremors [Depression] : no depression [Polydipsia] : no polydipsia [Anxiety] : no anxiety [Cold Intolerance] : cold tolerant [Galactorrhea] : no galactorrhea  [Easy Bruising] : no tendency for easy bruising [Easy Bleeding] : no ~M tendency for easy bleeding [Swelling] : no swelling

## 2019-05-10 LAB
25(OH)D3 SERPL-MCNC: 38.8 NG/ML
METANEPHRINE, PL: <10 PG/ML
NORMETANEPHRINE, PL: 44 PG/ML
THYROGLOB AB SERPL-ACNC: <20 IU/ML
THYROGLOB SERPL-MCNC: <0.2 NG/ML

## 2019-06-11 ENCOUNTER — APPOINTMENT (OUTPATIENT)
Dept: ENDOCRINOLOGY | Facility: CLINIC | Age: 59
End: 2019-06-11

## 2019-06-28 ENCOUNTER — APPOINTMENT (OUTPATIENT)
Dept: INTERNAL MEDICINE | Facility: CLINIC | Age: 59
End: 2019-06-28
Payer: COMMERCIAL

## 2019-06-28 VITALS
SYSTOLIC BLOOD PRESSURE: 134 MMHG | HEIGHT: 63 IN | WEIGHT: 158 LBS | TEMPERATURE: 98.7 F | HEART RATE: 78 BPM | OXYGEN SATURATION: 98 % | DIASTOLIC BLOOD PRESSURE: 76 MMHG | BODY MASS INDEX: 28 KG/M2

## 2019-06-28 DIAGNOSIS — Z00.00 ENCOUNTER FOR GENERAL ADULT MEDICAL EXAMINATION W/OUT ABNORMAL FINDINGS: ICD-10-CM

## 2019-06-28 DIAGNOSIS — E27.40 UNSPECIFIED ADRENOCORTICAL INSUFFICIENCY: ICD-10-CM

## 2019-06-28 DIAGNOSIS — S52.90XA UNSPECIFIED FRACTURE OF UNSPECIFIED FOREARM, INITIAL ENCOUNTER FOR CLOSED FRACTURE: ICD-10-CM

## 2019-06-28 DIAGNOSIS — I10 ESSENTIAL (PRIMARY) HYPERTENSION: ICD-10-CM

## 2019-06-28 LAB — HEMOCCULT STL QL IA: NEGATIVE

## 2019-06-28 PROCEDURE — 99396 PREV VISIT EST AGE 40-64: CPT

## 2019-06-28 RX ORDER — SERTRALINE HYDROCHLORIDE 25 MG/1
25 TABLET, FILM COATED ORAL
Refills: 0 | Status: DISCONTINUED | COMMUNITY
Start: 2018-08-09 | End: 2019-06-28

## 2019-06-28 RX ORDER — CETIRIZINE HCL 10 MG
10 TABLET ORAL
Refills: 0 | Status: ACTIVE | COMMUNITY

## 2019-06-28 RX ORDER — DENOSUMAB 60 MG/ML
60 INJECTION SUBCUTANEOUS
Qty: 1 | Refills: 1 | Status: DISCONTINUED | COMMUNITY
End: 2019-06-28

## 2019-06-28 RX ORDER — HYDROCORTISONE 5 MG/1
5 TABLET ORAL
Qty: 30 | Refills: 4 | Status: DISCONTINUED | COMMUNITY
Start: 2018-07-09 | End: 2019-06-28

## 2019-06-28 NOTE — ASSESSMENT
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 Adrenal insufficiency: on Florinef and Cortisone; f/u with Endo as well\par \par 2. Hypothyroidism: recheck TSH today, on Levothyroxine 112 daily; f/u with endo as well\par \par 3. HCM: mammo referral given; pt has GYN appt coming up for PAP/pelvic exams; FIT kit given for colon ca screening\par \par 4. Ortho: f/u with hand and Ortho given fall earlier this year; undergoing PT/OT for hand recovery as well

## 2019-06-28 NOTE — HEALTH RISK ASSESSMENT
[Good] : ~his/her~  mood as  good [] : No [No] : No [Any fall with injury in past year] : Patient reported fall with injury in the past year [Change in mental status noted] : No change in mental status noted [Language] : denies difficulty with language [Behavior] : denies difficulty with behavior [Learning/Retaining New Information] : denies difficulty learning/retaining new information [Handling Complex Tasks] : denies difficulty handling complex tasks [Reasoning] : denies difficulty with reasoning [Spatial Ability and Orientation] : denies difficulty with spatial ability and orientation [With Family] : lives with family [None] : None [Fully functional (using the telephone, shopping, preparing meals, housekeeping, doing laundry, using] : Fully functional and needs no help or supervision to perform IADLs (using the telephone, shopping, preparing meals, housekeeping, doing laundry, using transportation, managing medications and managing finances) [Feels Safe at Home] : Feels safe at home [Fully functional (bathing, dressing, toileting, transferring, walking, feeding)] : Fully functional (bathing, dressing, toileting, transferring, walking, feeding) [High Risk Behavior] : no high risk behavior [Reports changes in hearing] : Reports no changes in hearing [Reports changes in vision] : Reports no changes in vision [Reports normal functional visual acuity (ie: able to read med bottle)] : Reports normal functional visual acuity [Reports changes in dental health] : Reports no changes in dental health [Carbon Monoxide Detector] : carbon monoxide detector [Smoke Detector] : smoke detector [Guns at Home] : no guns at home [Safety elements used in home] : safety elements used in home [Seat Belt] :  uses seat belt [Sunscreen] : uses sunscreen [Travel to Developing Areas] : does not  travel to developing areas [TB Exposure] : is not being exposed to tuberculosis [Caregiver Concerns] : does not have caregiver concerns [Reviewed no changes] : Reviewed no changes [AdvancecareDate] : 06/19

## 2019-06-28 NOTE — PHYSICAL EXAM
[No Acute Distress] : no acute distress [Well Developed] : well developed [Well Nourished] : well nourished [Well-Appearing] : well-appearing [PERRL] : pupils equal round and reactive to light [EOMI] : extraocular movements intact [Normal Sclera/Conjunctiva] : normal sclera/conjunctiva [Normal Oropharynx] : the oropharynx was normal [Normal Outer Ear/Nose] : the outer ears and nose were normal in appearance [No JVD] : no jugular venous distention [Supple] : supple [Thyroid Normal, No Nodules] : the thyroid was normal and there were no nodules present [No Lymphadenopathy] : no lymphadenopathy [No Respiratory Distress] : no respiratory distress  [No Accessory Muscle Use] : no accessory muscle use [Clear to Auscultation] : lungs were clear to auscultation bilaterally [Normal Rate] : normal rate  [Normal S1, S2] : normal S1 and S2 [Regular Rhythm] : with a regular rhythm [No Murmur] : no murmur heard [No Carotid Bruits] : no carotid bruits [No Abdominal Bruit] : a ~M bruit was not heard ~T in the abdomen [No Varicosities] : no varicosities [Pedal Pulses Present] : the pedal pulses are present [No Extremity Clubbing/Cyanosis] : no extremity clubbing/cyanosis [No Edema] : there was no peripheral edema [Normal Appearance] : normal in appearance [No Palpable Aorta] : no palpable aorta [Soft] : abdomen soft [No Axillary Lymphadenopathy] : no axillary lymphadenopathy [No Nipple Discharge] : no nipple discharge [Non-distended] : non-distended [Non Tender] : non-tender [No HSM] : no HSM [Normal Bowel Sounds] : normal bowel sounds [No Masses] : no abdominal mass palpated [Normal Posterior Cervical Nodes] : no posterior cervical lymphadenopathy [No CVA Tenderness] : no CVA  tenderness [Normal Anterior Cervical Nodes] : no anterior cervical lymphadenopathy [No Spinal Tenderness] : no spinal tenderness [No Joint Swelling] : no joint swelling [Grossly Normal Strength/Tone] : grossly normal strength/tone [No Rash] : no rash [Coordination Grossly Intact] : coordination grossly intact [Normal Gait] : normal gait [No Focal Deficits] : no focal deficits [Deep Tendon Reflexes (DTR)] : deep tendon reflexes were 2+ and symmetric [Normal Insight/Judgement] : insight and judgment were intact [Normal Affect] : the affect was normal

## 2019-06-30 ENCOUNTER — RX RENEWAL (OUTPATIENT)
Age: 59
End: 2019-06-30

## 2019-07-03 LAB
25(OH)D3 SERPL-MCNC: 34.9 NG/ML
ALBUMIN SERPL ELPH-MCNC: 4.5 G/DL
ALP BLD-CCNC: 42 U/L
ALT SERPL-CCNC: 23 U/L
ANION GAP SERPL CALC-SCNC: 12 MMOL/L
AST SERPL-CCNC: 23 U/L
BASOPHILS # BLD AUTO: 0.02 K/UL
BASOPHILS NFR BLD AUTO: 0.3 %
BILIRUB SERPL-MCNC: 0.4 MG/DL
BUN SERPL-MCNC: 15 MG/DL
CALCIUM SERPL-MCNC: 9.9 MG/DL
CHLORIDE SERPL-SCNC: 98 MMOL/L
CHOLEST SERPL-MCNC: 182 MG/DL
CHOLEST/HDLC SERPL: 2.1 RATIO
CO2 SERPL-SCNC: 24 MMOL/L
CREAT SERPL-MCNC: 0.87 MG/DL
EOSINOPHIL # BLD AUTO: 0.04 K/UL
EOSINOPHIL NFR BLD AUTO: 0.6 %
ESTIMATED AVERAGE GLUCOSE: 117 MG/DL
GLUCOSE SERPL-MCNC: 91 MG/DL
HBA1C MFR BLD HPLC: 5.7 %
HCT VFR BLD CALC: 37.7 %
HDLC SERPL-MCNC: 85 MG/DL
HGB BLD-MCNC: 12 G/DL
IMM GRANULOCYTES NFR BLD AUTO: 0.3 %
LDLC SERPL CALC-MCNC: 83 MG/DL
LYMPHOCYTES # BLD AUTO: 0.99 K/UL
LYMPHOCYTES NFR BLD AUTO: 14.7 %
MAN DIFF?: NORMAL
MCHC RBC-ENTMCNC: 27.7 PG
MCHC RBC-ENTMCNC: 31.8 GM/DL
MCV RBC AUTO: 87.1 FL
MONOCYTES # BLD AUTO: 0.43 K/UL
MONOCYTES NFR BLD AUTO: 6.4 %
NEUTROPHILS # BLD AUTO: 5.22 K/UL
NEUTROPHILS NFR BLD AUTO: 77.7 %
PLATELET # BLD AUTO: 196 K/UL
POTASSIUM SERPL-SCNC: 4.9 MMOL/L
PROT SERPL-MCNC: 6.7 G/DL
RBC # BLD: 4.33 M/UL
RBC # FLD: 13.3 %
SODIUM SERPL-SCNC: 134 MMOL/L
T3 SERPL-MCNC: 72 NG/DL
T4 FREE SERPL-MCNC: 1.3 NG/DL
TRIGL SERPL-MCNC: 71 MG/DL
TSH SERPL-ACNC: 0.55 UIU/ML
WBC # FLD AUTO: 6.72 K/UL

## 2019-07-29 ENCOUNTER — RX RENEWAL (OUTPATIENT)
Age: 59
End: 2019-07-29

## 2019-07-30 ENCOUNTER — MEDICATION RENEWAL (OUTPATIENT)
Age: 59
End: 2019-07-30

## 2019-07-30 ENCOUNTER — RX RENEWAL (OUTPATIENT)
Age: 59
End: 2019-07-30

## 2019-07-30 ENCOUNTER — TRANSCRIPTION ENCOUNTER (OUTPATIENT)
Age: 59
End: 2019-07-30

## 2019-09-20 ENCOUNTER — MEDICATION RENEWAL (OUTPATIENT)
Age: 59
End: 2019-09-20

## 2019-09-23 ENCOUNTER — APPOINTMENT (OUTPATIENT)
Dept: OBGYN | Facility: CLINIC | Age: 59
End: 2019-09-23
Payer: COMMERCIAL

## 2019-09-23 VITALS
SYSTOLIC BLOOD PRESSURE: 124 MMHG | HEIGHT: 63 IN | DIASTOLIC BLOOD PRESSURE: 80 MMHG | BODY MASS INDEX: 28.88 KG/M2 | WEIGHT: 163 LBS

## 2019-09-23 DIAGNOSIS — Z86.19 PERSONAL HISTORY OF OTHER INFECTIOUS AND PARASITIC DISEASES: ICD-10-CM

## 2019-09-23 PROCEDURE — 99386 PREV VISIT NEW AGE 40-64: CPT

## 2019-09-23 NOTE — CHIEF COMPLAINT
[Initial Visit] : initial GYN visit [FreeTextEntry1] : Here to establish care\par Last pap 14 months ago \par patient with Von hippel landau syndrome

## 2019-09-24 LAB — HPV HIGH+LOW RISK DNA PNL CVX: NOT DETECTED

## 2019-09-25 ENCOUNTER — TRANSCRIPTION ENCOUNTER (OUTPATIENT)
Age: 59
End: 2019-09-25

## 2019-09-27 LAB — CYTOLOGY CVX/VAG DOC THIN PREP: ABNORMAL

## 2019-09-29 ENCOUNTER — RX RENEWAL (OUTPATIENT)
Age: 59
End: 2019-09-29

## 2019-10-21 ENCOUNTER — APPOINTMENT (OUTPATIENT)
Dept: INTERNAL MEDICINE | Facility: CLINIC | Age: 59
End: 2019-10-21
Payer: COMMERCIAL

## 2019-10-21 VITALS
SYSTOLIC BLOOD PRESSURE: 122 MMHG | TEMPERATURE: 98.7 F | BODY MASS INDEX: 28.53 KG/M2 | DIASTOLIC BLOOD PRESSURE: 74 MMHG | OXYGEN SATURATION: 98 % | HEIGHT: 63 IN | WEIGHT: 161 LBS | HEART RATE: 71 BPM

## 2019-10-21 DIAGNOSIS — G56.01 CARPAL TUNNEL SYNDROME, RIGHT UPPER LIMB: ICD-10-CM

## 2019-10-21 DIAGNOSIS — Z23 ENCOUNTER FOR IMMUNIZATION: ICD-10-CM

## 2019-10-21 PROCEDURE — 99214 OFFICE O/P EST MOD 30 MIN: CPT | Mod: 25

## 2019-10-21 PROCEDURE — 90686 IIV4 VACC NO PRSV 0.5 ML IM: CPT

## 2019-10-21 PROCEDURE — G0008: CPT

## 2019-10-21 NOTE — PHYSICAL EXAM
[No Acute Distress] : no acute distress [Well Developed] : well developed [Well Nourished] : well nourished [Well-Appearing] : well-appearing [Normal Sclera/Conjunctiva] : normal sclera/conjunctiva [EOMI] : extraocular movements intact [PERRL] : pupils equal round and reactive to light [Normal Oropharynx] : the oropharynx was normal [Normal Outer Ear/Nose] : the outer ears and nose were normal in appearance [No Lymphadenopathy] : no lymphadenopathy [No JVD] : no jugular venous distention [Supple] : supple [No Accessory Muscle Use] : no accessory muscle use [No Respiratory Distress] : no respiratory distress  [Thyroid Normal, No Nodules] : the thyroid was normal and there were no nodules present [Normal Rate] : normal rate  [Clear to Auscultation] : lungs were clear to auscultation bilaterally [Normal S1, S2] : normal S1 and S2 [Regular Rhythm] : with a regular rhythm [No Carotid Bruits] : no carotid bruits [No Murmur] : no murmur heard [No Abdominal Bruit] : a ~M bruit was not heard ~T in the abdomen [No Varicosities] : no varicosities [Pedal Pulses Present] : the pedal pulses are present [No Edema] : there was no peripheral edema [No Palpable Aorta] : no palpable aorta [No Extremity Clubbing/Cyanosis] : no extremity clubbing/cyanosis [Soft] : abdomen soft [Non-distended] : non-distended [Non Tender] : non-tender [No Masses] : no abdominal mass palpated [No HSM] : no HSM [Normal Posterior Cervical Nodes] : no posterior cervical lymphadenopathy [Normal Bowel Sounds] : normal bowel sounds [Normal Anterior Cervical Nodes] : no anterior cervical lymphadenopathy [No CVA Tenderness] : no CVA  tenderness [No Joint Swelling] : no joint swelling [No Spinal Tenderness] : no spinal tenderness [Grossly Normal Strength/Tone] : grossly normal strength/tone [Coordination Grossly Intact] : coordination grossly intact [No Rash] : no rash [No Focal Deficits] : no focal deficits [Normal Gait] : normal gait [Deep Tendon Reflexes (DTR)] : deep tendon reflexes were 2+ and symmetric [Normal Affect] : the affect was normal [Normal Insight/Judgement] : insight and judgment were intact

## 2019-10-21 NOTE — HISTORY OF PRESENT ILLNESS
[No Pertinent Cardiac History] : no history of aortic stenosis, atrial fibrillation, coronary artery disease, recent myocardial infarction, or implantable device/pacemaker [No Pertinent Pulmonary History] : no history of asthma, COPD, sleep apnea, or smoking [No Adverse Anesthesia Reaction] : no adverse anesthesia reaction in self or family member [Excellent (>10 METs)] : Excellent (>10 METs) [(Patient denies any chest pain, claudication, dyspnea on exertion, orthopnea, palpitations or syncope)] : Patient denies any chest pain, claudication, dyspnea on exertion, orthopnea, palpitations or syncope [Aortic Stenosis] : no aortic stenosis [Atrial Fibrillation] : no atrial fibrillation [Recent Myocardial Infarction] : no recent myocardial infarction [Coronary Artery Disease] : no coronary artery disease [Implantable Device/Pacemaker] : no implantable device/pacemaker [Asthma] : no asthma [COPD] : no COPD [Sleep Apnea] : no sleep apnea [Smoker] : not a smoker [Family Member] : no family member with adverse anesthesia reaction/sudden death [Self] : no previous adverse anesthesia reaction [Chronic Anticoagulation] : no chronic anticoagulation [Chronic Kidney Disease] : no chronic kidney disease [FreeTextEntry2] : 10/23/19 [Diabetes] : no diabetes [FreeTextEntry1] : right carpal tunnel and right elbow surgery [FreeTextEntry3] : Dr. Doe Barger

## 2019-11-08 ENCOUNTER — APPOINTMENT (OUTPATIENT)
Dept: ENDOCRINOLOGY | Facility: CLINIC | Age: 59
End: 2019-11-08
Payer: COMMERCIAL

## 2019-11-08 VITALS
DIASTOLIC BLOOD PRESSURE: 72 MMHG | SYSTOLIC BLOOD PRESSURE: 120 MMHG | BODY MASS INDEX: 29.06 KG/M2 | OXYGEN SATURATION: 98 % | HEART RATE: 74 BPM | HEIGHT: 63 IN | WEIGHT: 164 LBS

## 2019-11-08 DIAGNOSIS — Q85.8 OTHER PHAKOMATOSES, NOT ELSEWHERE CLASSIFIED: ICD-10-CM

## 2019-11-08 DIAGNOSIS — C73 MALIGNANT NEOPLASM OF THYROID GLAND: ICD-10-CM

## 2019-11-08 DIAGNOSIS — E03.9 HYPOTHYROIDISM, UNSPECIFIED: ICD-10-CM

## 2019-11-08 PROCEDURE — 99214 OFFICE O/P EST MOD 30 MIN: CPT

## 2019-11-08 NOTE — PHYSICAL EXAM
[Alert] : alert [No Acute Distress] : no acute distress [Well Developed] : well developed [Well Nourished] : well nourished [PERRL] : pupils equal, round and reactive to light [Normal Sclera/Conjunctiva] : normal sclera/conjunctiva [EOMI] : extra ocular movement intact [No Proptosis] : no proptosis [Normal Outer Ear/Nose] : the ears and nose were normal in appearance [Normal Hearing] : hearing was normal [Normal TMs] : both tympanic membranes were normal [No Neck Mass] : no neck mass was observed [Supple] : the neck was supple [Thyroid Not Enlarged] : the thyroid was not enlarged [No Respiratory Distress] : no respiratory distress [Normal Rate and Effort] : normal respiratory rhythm and effort [Clear to Auscultation] : lungs were clear to auscultation bilaterally [Normal Rate] : heart rate was normal  [Normal S1, S2] : normal S1 and S2 [Regular Rhythm] : with a regular rhythm [Not Tender] : non-tender [Normal Bowel Sounds] : normal bowel sounds [Soft] : abdomen soft [No CVA Tenderness] : no ~M costovertebral angle tenderness [Not Distended] : not distended [No Spinal Tenderness] : no spinal tenderness [No Joint Swelling] : no joint swelling seen [Normal Gait] : normal gait [No Rash] : no rash [Normal Strength/Tone] : muscle strength and tone were normal [No Clubbing, Cyanosis] : no clubbing  or cyanosis of the fingernails [Normal Reflexes] : deep tendon reflexes were 2+ and symmetric [No Motor Deficits] : the motor exam was normal [No Skin Lesions] : no skin lesions [Oriented x3] : oriented to person, place, and time [No Tremors] : no tremors [Normal Mood] : the mood was normal [Normal Affect] : the affect was normal [Normal Insight/Judgement] : insight and judgment were intact

## 2019-11-08 NOTE — HISTORY OF PRESENT ILLNESS
[FreeTextEntry1] : Pheochromocytoma removal 1969, 1970 and 1977 and at that point had bilateral adrenal removal. \par Since then she has been on cortisone acetate 25 mg 1/2 in the morning and 1/2 in the afternoon and florinef 0.05 mg once a day.\par These doses have not been adjusted since the 1970s. \par History of VHL in the family. Her mother and her brother and her two nieces have it as well. \par \par History of thyroid cancer (total thyroidectomy in 2013): Surgery was done at [place]. She never had AVILA. She thinks it was papillary thyroid cancer. \par She was being followed by her PCP. She does not remember when she had her last thyroid ultrasound. \par She was noted to have an elevated TSH and then increased from levothyroxine 112 \par She has been having fatigue. \par She has gained 15 lbs in the last year. \par \par She has history of osteopenia and has been treated with Prolia. Last prolia injection was in 03/2018 \par She reported that she took actonel. \par Her last bone density was a year ago. Will be sending the reports in to us. \par Currently she is taking boniva \par  ---------------------------------------------------------------------------------------------------------\par She has decreased her hydrocortisone to 10-10 mg \par Energy levels are within normal limits \par Recently had surgery in the right hand for carpal tunnel \par Gained 6 lbs since the last visit

## 2019-11-08 NOTE — REVIEW OF SYSTEMS
[Fatigue] : no fatigue [Recent Weight Gain (___ Lbs)] : recent [unfilled] ~Ulb weight gain [Visual Field Defect] : no visual field defect [Recent Weight Loss (___ Lbs)] : no recent weight loss [Blurry Vision] : no blurred vision [Dry Eyes] : no dryness of the eyes [Neck Pain] : no neck pain [Eyes Itch] : no itching of the eyes [Dysphonia] : no dysphonia [Dysphagia] : no dysphagia [Palpitations] : no palpitations [Nasal Congestion] : no nasal congestion [Chest Pain] : no chest pain [Heart Rate Is Fast] : the heart rate was not fast [Shortness Of Breath] : no shortness of breath [Heart Rate Is Slow] : the heart rate was not slow [SOB on Exertion] : no shortness of breath during exertion [Wheezing] : no wheezing was heard [Cough] : no cough [Nausea] : no nausea [Diarrhea] : no diarrhea [Constipation] : no constipation [Vomiting] : no vomiting was observed [Irregular Menses] : regular menses [Joint Pain] : no joint pain [Polyuria] : no polyuria [Muscle Weakness] : no muscle weakness [Joint Stiffness] : no joint stiffness [Muscle Cramps] : no muscle cramps [Acanthosis] : no acanthosis  [Headache] : no headaches [Hirsutism] : no hirsutism [Dizziness] : no dizziness [Pain/Numbness of Digits] : no pain/numbness of digits [Tremors] : no tremors [Polydipsia] : no polydipsia [Depression] : no depression [Anxiety] : no anxiety [Cold Intolerance] : cold tolerant [Galactorrhea] : no galactorrhea  [Heat Intolerance] : heat tolerant [Easy Bleeding] : no ~M tendency for easy bleeding [Easy Bruising] : no tendency for easy bruising [Swelling] : no swelling

## 2019-11-10 ENCOUNTER — TRANSCRIPTION ENCOUNTER (OUTPATIENT)
Age: 59
End: 2019-11-10

## 2019-11-10 LAB
25(OH)D3 SERPL-MCNC: 40.7 NG/ML
ANION GAP SERPL CALC-SCNC: 14 MMOL/L
BUN SERPL-MCNC: 20 MG/DL
CALCIUM SERPL-MCNC: 10.2 MG/DL
CHLORIDE SERPL-SCNC: 100 MMOL/L
CO2 SERPL-SCNC: 23 MMOL/L
CREAT SERPL-MCNC: 0.92 MG/DL
ESTIMATED AVERAGE GLUCOSE: 120 MG/DL
GLUCOSE SERPL-MCNC: 74 MG/DL
HBA1C MFR BLD HPLC: 5.8 %
POTASSIUM SERPL-SCNC: 5.1 MMOL/L
SODIUM SERPL-SCNC: 137 MMOL/L
T4 FREE SERPL-MCNC: 1.2 NG/DL
THYROGLOB AB SERPL-ACNC: <20 IU/ML
THYROGLOB SERPL-MCNC: <0.2 NG/ML
TSH SERPL-ACNC: 0.87 UIU/ML

## 2019-12-13 ENCOUNTER — APPOINTMENT (OUTPATIENT)
Dept: MRI IMAGING | Facility: CLINIC | Age: 59
End: 2019-12-13

## 2019-12-29 ENCOUNTER — RX RENEWAL (OUTPATIENT)
Age: 59
End: 2019-12-29

## 2020-01-22 ENCOUNTER — RX RENEWAL (OUTPATIENT)
Age: 60
End: 2020-01-22

## 2020-02-04 ENCOUNTER — RX RENEWAL (OUTPATIENT)
Age: 60
End: 2020-02-04

## 2020-02-25 ENCOUNTER — RX RENEWAL (OUTPATIENT)
Age: 60
End: 2020-02-25

## 2020-03-24 ENCOUNTER — TRANSCRIPTION ENCOUNTER (OUTPATIENT)
Age: 60
End: 2020-03-24

## 2020-04-11 ENCOUNTER — TRANSCRIPTION ENCOUNTER (OUTPATIENT)
Age: 60
End: 2020-04-11

## 2020-04-24 ENCOUNTER — APPOINTMENT (OUTPATIENT)
Dept: INTERNAL MEDICINE | Facility: CLINIC | Age: 60
End: 2020-04-24
Payer: COMMERCIAL

## 2020-04-24 DIAGNOSIS — U07.1 COVID-19: ICD-10-CM

## 2020-04-24 PROCEDURE — 99214 OFFICE O/P EST MOD 30 MIN: CPT | Mod: 95

## 2020-04-24 NOTE — HISTORY OF PRESENT ILLNESS
[Home] : at home, [unfilled] , at the time of the visit. [Medical Office: ([facility])___] : at the medical office located in  [Patient] : the patient [Self] : self [FreeTextEntry1] : \par Tired with very long naps daily\par Took extra strength Tylenol today\par Headache daily but today with more body aches; \par \par Son: with COVID19 5 weeks ago - last contact was about 5 days ago

## 2020-04-24 NOTE — PLAN
[FreeTextEntry1] : \par \par High risk given immunocompromised state: adrenal insufficiency at baseline on hydrocortisone daily; will need to adjust dose if spikes a fever. \par Recommend temp checks q8hr\par Monitor breathing \par Take Tylenol after checking temperature - for muscle aches and fevers\par For cough/ sinus congestion: can take cough suppressant or cepacol lozenges; recommend nasal spray + Claritin / Allegra OTC;\par Maintain quarantine; frequent hand washing and hygiene\par \par all questions answered, patient amenable to plan above \par Pt counseled to call MD if new symptoms develop or worsen. \par \par Arun Son: [phone]\par \par 25 mins spent on this vision >50% of time spent on counseling and coordination of care for COVID19\par

## 2020-04-26 NOTE — PLAN
[FreeTextEntry1] : \par Recommend temp checks q6hr\par Monitor breathing \par Take Tylenol after checking temperature - for muscle aches and fevers\par For cough/ sinus congestion: can take cough suppressant or cepacol lozenges; recommend nasal spray + Claritin / Allegra OTC;\par Maintain quarantine; frequent hand washing and hygiene\par Pt counseled to call MD if new symptoms develop or worsen. \par \par

## 2020-05-04 ENCOUNTER — APPOINTMENT (OUTPATIENT)
Dept: ENDOCRINOLOGY | Facility: CLINIC | Age: 60
End: 2020-05-04

## 2020-06-25 ENCOUNTER — RX RENEWAL (OUTPATIENT)
Age: 60
End: 2020-06-25

## 2020-06-25 RX ORDER — FLUDROCORTISONE ACETATE 0.1 MG/1
0.1 TABLET ORAL
Qty: 15 | Refills: 11 | Status: ACTIVE | COMMUNITY
Start: 2018-08-09 | End: 1900-01-01

## 2020-07-13 ENCOUNTER — APPOINTMENT (OUTPATIENT)
Dept: ENDOCRINOLOGY | Facility: CLINIC | Age: 60
End: 2020-07-13
Payer: COMMERCIAL

## 2020-07-13 VITALS
WEIGHT: 162 LBS | HEART RATE: 79 BPM | DIASTOLIC BLOOD PRESSURE: 70 MMHG | TEMPERATURE: 97.9 F | HEIGHT: 62.9 IN | SYSTOLIC BLOOD PRESSURE: 102 MMHG | BODY MASS INDEX: 28.7 KG/M2 | OXYGEN SATURATION: 98 %

## 2020-07-13 PROCEDURE — 99214 OFFICE O/P EST MOD 30 MIN: CPT

## 2020-07-13 PROCEDURE — 77085 DXA BONE DENSITY AXL VRT FX: CPT

## 2020-07-13 NOTE — REVIEW OF SYSTEMS
[Fatigue] : no fatigue [Decreased Appetite] : appetite not decreased [Recent Weight Gain (___ Lbs)] : no recent weight gain [Recent Weight Loss (___ Lbs)] : no recent weight loss [Dry Eyes] : no dryness [Visual Field Defect] : no visual field defect [Dysphonia] : no dysphonia [Neck Pain] : no neck pain [Nasal Congestion] : no nasal congestion [Dysphagia] : no dysphagia [Slow Heart Rate] : heart rate is not slow [Chest Pain] : no chest pain [Shortness Of Breath] : no shortness of breath [Fast Heart Rate] : heart rate is not fast [Palpitations] : no palpitations [Nausea] : no nausea [Cough] : no cough [Constipation] : no constipation [Diarrhea] : no diarrhea [Vomiting] : no vomiting [Irregular Menses] : regular menses [Polyuria] : no polyuria [Joint Pain] : no joint pain [Headaches] : no headaches [Muscle Weakness] : no muscle weakness [Polydipsia] : no polydipsia [Tremors] : no tremors [Cold Intolerance] : no cold intolerance [Easy Bleeding] : no ~M tendency for easy bleeding [Easy Bruising] : no tendency for easy bruising

## 2020-07-13 NOTE — HISTORY OF PRESENT ILLNESS
[FreeTextEntry1] : 60 year old female with history of VHL,  pheochromocytoma, thyroid ca, osteopenia here for f/u \par \par Pheochromocytoma removal 1969, 1970 and 1977 and at that point had bilateral adrenal removal. \par Since then she has been on cortisone acetate 25 mg 1/2 in the morning and 1/2 in the afternoon and florinef 0.05 mg once a day.\par These doses have not been adjusted since the 1970s. \par History of VHL in the family. Her mother and her brother and her two nieces have it as well. \par \par History of thyroid cancer (total thyroidectomy in 2013): Surgery was done at [place]. She never had AVILA. She thinks it was papillary thyroid cancer. \par She was being followed by her PCP. She does not remember when she had her last thyroid ultrasound. \par She was noted to have an elevated TSH and then increased from levothyroxine 112 \par -Normal energy levels \par -Lost 2 lbs \par -occasional HP or tremors \par -Occasional constipation \par \par She has history of osteopenia and has been treated with Prolia. Last prolia injection was in 03/2018 \par She reported that she took actonel. \par Her last bone density was a year ago. Will be sending the reports in to us. \par Currently she is taking boniva \par \par Reported that she has been increased GERD symptoms recently wih the boniva \par  ---------------------------------------------------------------------------------------------------------\par She has decreased her hydrocortisone to 10-10 mg \par Energy levels are within normal limits \par Recently had surgery in the right hand for carpal tunnel \par Gained 6 lbs since the last visit \par \par

## 2020-07-13 NOTE — ASSESSMENT
[FreeTextEntry1] : 60 year old female with history of VHL (pheochromocytoma) s/p B/L adrenalectomy, thyroid cancer s/p total thyroidectomy, hypertension, osteopenia who presented for follow-up\par \par VHL S/p pheochromocytoma with B/L adrenalectomy \par -Hydrocortisone now is 10-10 mg daily \par -will need yearly DEXA scans since she is being managed on steroids long term ( next one scheduled on 06/2021)\par ( DEXA today showing Fem neck of -1.7, Distal 1/3rd radius: -0.6)\par -Eye/retinal examination with indirect ophthalmoscopy using a dilated examination yearly ( appt on 12/04/2019) \par  ( Normal previous exam) \par -Ultrasound and MRI scan of the abdomen with and without contrast to assess the kidneys, pancreas, and adrenals at least yearly ( Order is provided ) - PA is required \par -Once every 2 years will need MRI of the brain and L-spine and thoracic spine -due in 09/2020 and audiology evaluation ( audiology referral provided) \par -Recent audiology testing was showing some mild sensorineural loss ( 03/2019)\par \par Thyroid cancer\par -Reported as micropapillary thyroid cancer, but will obtain the original pathology report\par -At this time will check thyroglobulin, thyroglobulin ab \par -Most recent TSH was 0.7\par -Will check TFTs at this time \par -Continue levothyroxine at current dose for now \par \par Osteopenia\par -She is having GERD with boniva so will be switching her to reclast \par ( DEXA today showing Fem neck of -1.7, Distal 1/3rd radius: -0.6)\par -DEXA scan in 06/2021\par \par Follow up in 6 months. \par

## 2020-07-13 NOTE — PHYSICAL EXAM
[Well Nourished] : well nourished [Alert] : alert [No Acute Distress] : no acute distress [Normal Sclera/Conjunctiva] : normal sclera/conjunctiva [PERRL] : pupils equal, round and reactive to light [Normal Outer Ear/Nose] : the ears and nose were normal in appearance [Normal TMs] : both tympanic membranes were normal [No Neck Mass] : no neck mass was observed [Thyroid Not Enlarged] : the thyroid was not enlarged [No Respiratory Distress] : no respiratory distress [Normal S1, S2] : normal S1 and S2 [Clear to Auscultation] : lungs were clear to auscultation bilaterally [Normal Rate] : heart rate was normal [Regular Rhythm] : with a regular rhythm [Normal Bowel Sounds] : normal bowel sounds [Soft] : abdomen soft [Not Tender] : non-tender [No Clubbing, Cyanosis] : no clubbing  or cyanosis of the fingernails [Normal Gait] : normal gait [No Rash] : no rash [Normal Strength/Tone] : muscle strength and tone were normal [No Joint Swelling] : no joint swelling seen [No Motor Deficits] : the motor exam was normal [No Skin Lesions] : no skin lesions [Normal Reflexes] : deep tendon reflexes were 2+ and symmetric [Oriented x3] : oriented to person, place, and time [No Tremors] : no tremors [Normal Affect] : the affect was normal [Normal Insight/Judgement] : insight and judgment were intact [Normal Mood] : the mood was normal

## 2020-07-15 RX ORDER — ZOLEDRONIC ACID 5 MG/100ML
5 INJECTION INTRAVENOUS
Qty: 1 | Refills: 0 | Status: ACTIVE | OUTPATIENT
Start: 2020-07-15

## 2020-07-16 ENCOUNTER — TRANSCRIPTION ENCOUNTER (OUTPATIENT)
Age: 60
End: 2020-07-16

## 2020-07-16 LAB
25(OH)D3 SERPL-MCNC: 47.4 NG/ML
ALBUMIN SERPL ELPH-MCNC: 4.7 G/DL
ALP BLD-CCNC: 59 U/L
ALT SERPL-CCNC: 21 U/L
ANION GAP SERPL CALC-SCNC: 14 MMOL/L
AST SERPL-CCNC: 24 U/L
BILIRUB SERPL-MCNC: 0.3 MG/DL
BUN SERPL-MCNC: 22 MG/DL
CALCIUM SERPL-MCNC: 9.9 MG/DL
CHLORIDE SERPL-SCNC: 104 MMOL/L
CO2 SERPL-SCNC: 23 MMOL/L
CREAT SERPL-MCNC: 0.98 MG/DL
ESTIMATED AVERAGE GLUCOSE: 117 MG/DL
GLUCOSE SERPL-MCNC: 96 MG/DL
HBA1C MFR BLD HPLC: 5.7 %
POTASSIUM SERPL-SCNC: 4.7 MMOL/L
PROT SERPL-MCNC: 6.9 G/DL
SODIUM SERPL-SCNC: 141 MMOL/L
T3 SERPL-MCNC: 77 NG/DL
T4 FREE SERPL-MCNC: 1.3 NG/DL
THYROGLOB AB SERPL-ACNC: <20 IU/ML
THYROGLOB SERPL-MCNC: <0.2 NG/ML
TSH SERPL-ACNC: 0.39 UIU/ML

## 2020-07-23 ENCOUNTER — RX RENEWAL (OUTPATIENT)
Age: 60
End: 2020-07-23

## 2020-07-29 ENCOUNTER — APPOINTMENT (OUTPATIENT)
Dept: RHEUMATOLOGY | Facility: CLINIC | Age: 60
End: 2020-07-29

## 2020-07-31 ENCOUNTER — TRANSCRIPTION ENCOUNTER (OUTPATIENT)
Age: 60
End: 2020-07-31

## 2020-08-03 ENCOUNTER — TRANSCRIPTION ENCOUNTER (OUTPATIENT)
Age: 60
End: 2020-08-03

## 2020-08-25 ENCOUNTER — APPOINTMENT (OUTPATIENT)
Dept: INTERNAL MEDICINE | Facility: CLINIC | Age: 60
End: 2020-08-25
Payer: COMMERCIAL

## 2020-08-25 VITALS
OXYGEN SATURATION: 96 % | DIASTOLIC BLOOD PRESSURE: 65 MMHG | HEIGHT: 62 IN | HEART RATE: 74 BPM | WEIGHT: 167 LBS | SYSTOLIC BLOOD PRESSURE: 103 MMHG | BODY MASS INDEX: 30.73 KG/M2 | TEMPERATURE: 97.2 F
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
## 2020-08-25 PROCEDURE — 99213 OFFICE O/P EST LOW 20 MIN: CPT

## 2020-08-25 NOTE — HISTORY OF PRESENT ILLNESS
[FreeTextEntry8] : Suffered mechanical fall 1 month ago, now with low back pain, not on spine but rather in paraspinal region. No change in bowel or bladder continence. No fevers and no chills.

## 2020-08-25 NOTE — PHYSICAL EXAM
[No Acute Distress] : no acute distress [Well Nourished] : well nourished [Well-Appearing] : well-appearing [Well Developed] : well developed [Normal Sclera/Conjunctiva] : normal sclera/conjunctiva [EOMI] : extraocular movements intact [PERRL] : pupils equal round and reactive to light [Normal Outer Ear/Nose] : the outer ears and nose were normal in appearance [No JVD] : no jugular venous distention [Normal Oropharynx] : the oropharynx was normal [No Lymphadenopathy] : no lymphadenopathy [Thyroid Normal, No Nodules] : the thyroid was normal and there were no nodules present [Supple] : supple [No Respiratory Distress] : no respiratory distress  [No Accessory Muscle Use] : no accessory muscle use [Normal Rate] : normal rate  [Clear to Auscultation] : lungs were clear to auscultation bilaterally [Regular Rhythm] : with a regular rhythm [No Murmur] : no murmur heard [Normal S1, S2] : normal S1 and S2 [No Varicosities] : no varicosities [No Abdominal Bruit] : a ~M bruit was not heard ~T in the abdomen [No Carotid Bruits] : no carotid bruits [No Palpable Aorta] : no palpable aorta [No Edema] : there was no peripheral edema [Pedal Pulses Present] : the pedal pulses are present [Non Tender] : non-tender [No Extremity Clubbing/Cyanosis] : no extremity clubbing/cyanosis [Soft] : abdomen soft [Non-distended] : non-distended [No Masses] : no abdominal mass palpated [Normal Posterior Cervical Nodes] : no posterior cervical lymphadenopathy [Normal Bowel Sounds] : normal bowel sounds [No HSM] : no HSM [Normal Anterior Cervical Nodes] : no anterior cervical lymphadenopathy [No Spinal Tenderness] : no spinal tenderness [No CVA Tenderness] : no CVA  tenderness [Grossly Normal Strength/Tone] : grossly normal strength/tone [No Joint Swelling] : no joint swelling [No Rash] : no rash [No Focal Deficits] : no focal deficits [Normal Gait] : normal gait [Coordination Grossly Intact] : coordination grossly intact [Normal Insight/Judgement] : insight and judgment were intact [Normal Affect] : the affect was normal

## 2020-08-25 NOTE — ASSESSMENT
[FreeTextEntry1] : \par Mechanical fall with lumbar paraspinal muscle spasm. Advised physical therapy and Tylenol as needed for relief.

## 2020-09-01 ENCOUNTER — RX RENEWAL (OUTPATIENT)
Age: 60
End: 2020-09-01

## 2020-09-11 ENCOUNTER — APPOINTMENT (OUTPATIENT)
Dept: ENDOCRINOLOGY | Facility: CLINIC | Age: 60
End: 2020-09-11
Payer: COMMERCIAL

## 2020-09-11 ENCOUNTER — MED ADMIN CHARGE (OUTPATIENT)
Age: 60
End: 2020-09-11

## 2020-09-11 PROCEDURE — 96401 CHEMO ANTI-NEOPL SQ/IM: CPT

## 2020-09-11 RX ORDER — DENOSUMAB 60 MG/ML
60 INJECTION SUBCUTANEOUS
Qty: 1 | Refills: 0 | Status: COMPLETED | OUTPATIENT
Start: 2020-09-11

## 2020-09-30 ENCOUNTER — APPOINTMENT (OUTPATIENT)
Dept: OBGYN | Facility: CLINIC | Age: 60
End: 2020-09-30

## 2020-10-13 ENCOUNTER — APPOINTMENT (OUTPATIENT)
Dept: INTERNAL MEDICINE | Facility: CLINIC | Age: 60
End: 2020-10-13

## 2020-11-08 ENCOUNTER — RX RENEWAL (OUTPATIENT)
Age: 60
End: 2020-11-08

## 2020-11-08 RX ORDER — LEVOTHYROXINE SODIUM 0.11 MG/1
112 TABLET ORAL
Qty: 90 | Refills: 1 | Status: ACTIVE | COMMUNITY
Start: 2019-01-30 | End: 1900-01-01

## 2021-01-02 ENCOUNTER — RX RENEWAL (OUTPATIENT)
Age: 61
End: 2021-01-02

## 2021-01-03 ENCOUNTER — TRANSCRIPTION ENCOUNTER (OUTPATIENT)
Age: 61
End: 2021-01-03

## 2021-01-03 RX ORDER — HYDROCORTISONE 10 MG/1
10 TABLET ORAL
Qty: 60 | Refills: 5 | Status: ACTIVE | COMMUNITY
Start: 2018-07-09 | End: 1900-01-01

## 2021-01-06 ENCOUNTER — APPOINTMENT (OUTPATIENT)
Dept: OBGYN | Facility: CLINIC | Age: 61
End: 2021-01-06
Payer: COMMERCIAL

## 2021-01-06 ENCOUNTER — APPOINTMENT (OUTPATIENT)
Dept: GASTROENTEROLOGY | Facility: CLINIC | Age: 61
End: 2021-01-06

## 2021-01-06 VITALS
WEIGHT: 164 LBS | HEIGHT: 62 IN | DIASTOLIC BLOOD PRESSURE: 84 MMHG | SYSTOLIC BLOOD PRESSURE: 130 MMHG | TEMPERATURE: 97.1 F | BODY MASS INDEX: 30.18 KG/M2

## 2021-01-06 DIAGNOSIS — Z01.818 ENCOUNTER FOR OTHER PREPROCEDURAL EXAMINATION: ICD-10-CM

## 2021-01-06 DIAGNOSIS — L73.2 HIDRADENITIS SUPPURATIVA: ICD-10-CM

## 2021-01-06 DIAGNOSIS — Z12.11 ENCOUNTER FOR SCREENING FOR MALIGNANT NEOPLASM OF COLON: ICD-10-CM

## 2021-01-06 PROCEDURE — 99396 PREV VISIT EST AGE 40-64: CPT

## 2021-01-06 PROCEDURE — 99072 ADDL SUPL MATRL&STAF TM PHE: CPT

## 2021-01-06 RX ORDER — IBANDRONATE SODIUM 150 MG/1
150 TABLET ORAL
Qty: 1 | Refills: 5 | Status: DISCONTINUED | COMMUNITY
Start: 2019-05-03 | End: 2021-01-06

## 2021-01-06 NOTE — HISTORY OF PRESENT ILLNESS
[Patient reported mammogram was normal] : Patient reported mammogram was normal [Patient reported PAP Smear was normal] : Patient reported PAP Smear was normal [Patient reported colonoscopy was normal] : Patient reported colonoscopy was normal [Mammogramdate] : 2019 [PapSmeardate] : 2018 [ColonoscopyDate] : 2020

## 2021-01-06 NOTE — PHYSICAL EXAM
[Appropriately responsive] : appropriately responsive [Alert] : alert [No Acute Distress] : no acute distress [No Lymphadenopathy] : no lymphadenopathy [Regular Rate Rhythm] : regular rate rhythm [No Murmurs] : no murmurs [Clear to Auscultation B/L] : clear to auscultation bilaterally [Soft] : soft [Non-tender] : non-tender [Non-distended] : non-distended [No HSM] : No HSM [No Lesions] : no lesions [No Mass] : no mass [Oriented x3] : oriented x3 [Examination Of The Breasts] : a normal appearance [No Masses] : no breast masses were palpable [Labia Majora] : normal [Labia Minora] : normal [Normal] : normal [Uterine Adnexae] : normal [FreeTextEntry1] : hidradenitis suppurativa on inner thighs

## 2021-01-07 ENCOUNTER — TRANSCRIPTION ENCOUNTER (OUTPATIENT)
Age: 61
End: 2021-01-07

## 2021-01-07 LAB — HPV HIGH+LOW RISK DNA PNL CVX: NOT DETECTED

## 2021-01-07 RX ORDER — SERTRALINE HYDROCHLORIDE 50 MG/1
50 TABLET, FILM COATED ORAL
Qty: 90 | Refills: 2 | Status: ACTIVE | COMMUNITY
Start: 2021-01-07 | End: 1900-01-01

## 2021-01-07 RX ORDER — SERTRALINE 25 MG/1
25 TABLET, FILM COATED ORAL
Qty: 90 | Refills: 3 | Status: ACTIVE | COMMUNITY
Start: 2021-01-07 | End: 1900-01-01

## 2021-01-07 RX ORDER — SERTRALINE HYDROCHLORIDE 100 MG/1
100 TABLET, FILM COATED ORAL DAILY
Qty: 90 | Refills: 0 | Status: DISCONTINUED | COMMUNITY
Start: 2018-08-09 | End: 2021-01-07

## 2021-01-11 LAB — CYTOLOGY CVX/VAG DOC THIN PREP: ABNORMAL

## 2021-01-15 ENCOUNTER — APPOINTMENT (OUTPATIENT)
Dept: ENDOCRINOLOGY | Facility: CLINIC | Age: 61
End: 2021-01-15

## 2021-01-26 RX ORDER — DENOSUMAB 60 MG/ML
60 INJECTION SUBCUTANEOUS
Qty: 1 | Refills: 1 | Status: ACTIVE | COMMUNITY
Start: 2020-08-26 | End: 1900-01-01

## 2021-02-10 ENCOUNTER — NON-APPOINTMENT (OUTPATIENT)
Age: 61
End: 2021-02-10

## 2021-02-25 ENCOUNTER — APPOINTMENT (OUTPATIENT)
Dept: INTERNAL MEDICINE | Facility: CLINIC | Age: 61
End: 2021-02-25

## 2021-02-26 ENCOUNTER — APPOINTMENT (OUTPATIENT)
Dept: INTERNAL MEDICINE | Facility: CLINIC | Age: 61
End: 2021-02-26

## 2021-02-26 DIAGNOSIS — Z23 ENCOUNTER FOR IMMUNIZATION: ICD-10-CM

## 2021-03-19 ENCOUNTER — APPOINTMENT (OUTPATIENT)
Dept: ENDOCRINOLOGY | Facility: CLINIC | Age: 61
End: 2021-03-19

## 2021-03-26 ENCOUNTER — NON-APPOINTMENT (OUTPATIENT)
Age: 61
End: 2021-03-26

## 2021-03-26 ENCOUNTER — APPOINTMENT (OUTPATIENT)
Dept: ENDOCRINOLOGY | Facility: CLINIC | Age: 61
End: 2021-03-26
Payer: COMMERCIAL

## 2021-03-26 DIAGNOSIS — M85.80 OTHER SPECIFIED DISORDERS OF BONE DENSITY AND STRUCTURE, UNSPECIFIED SITE: ICD-10-CM

## 2021-03-26 PROCEDURE — 96401 CHEMO ANTI-NEOPL SQ/IM: CPT

## 2021-03-26 PROCEDURE — 99072 ADDL SUPL MATRL&STAF TM PHE: CPT

## 2021-03-26 RX ORDER — DENOSUMAB 60 MG/ML
60 INJECTION SUBCUTANEOUS
Qty: 1 | Refills: 0 | Status: COMPLETED | OUTPATIENT
Start: 2021-03-26

## 2021-03-26 RX ADMIN — DENOSUMAB 0 MG/ML: 60 INJECTION SUBCUTANEOUS at 00:00

## 2021-03-30 ENCOUNTER — TRANSCRIPTION ENCOUNTER (OUTPATIENT)
Age: 61
End: 2021-03-30

## 2021-03-30 LAB
ALBUMIN SERPL ELPH-MCNC: 4.2 G/DL
ALP BLD-CCNC: 51 U/L
ALT SERPL-CCNC: 17 U/L
ANION GAP SERPL CALC-SCNC: 12 MMOL/L
AST SERPL-CCNC: 17 U/L
BILIRUB SERPL-MCNC: 0.3 MG/DL
BUN SERPL-MCNC: 22 MG/DL
CALCIUM SERPL-MCNC: 9.9 MG/DL
CHLORIDE SERPL-SCNC: 108 MMOL/L
CO2 SERPL-SCNC: 23 MMOL/L
CREAT SERPL-MCNC: 0.87 MG/DL
GLUCOSE SERPL-MCNC: 80 MG/DL
POTASSIUM SERPL-SCNC: 4.2 MMOL/L
PROT SERPL-MCNC: 6.4 G/DL
SODIUM SERPL-SCNC: 143 MMOL/L

## 2021-05-24 ENCOUNTER — RX RENEWAL (OUTPATIENT)
Age: 61
End: 2021-05-24

## 2021-05-24 RX ORDER — PANTOPRAZOLE 40 MG/1
40 TABLET, DELAYED RELEASE ORAL
Qty: 90 | Refills: 3 | Status: ACTIVE | COMMUNITY
Start: 2020-02-04 | End: 1900-01-01

## 2021-08-22 ENCOUNTER — RX RENEWAL (OUTPATIENT)
Age: 61
End: 2021-08-22

## 2021-08-26 DIAGNOSIS — Z01.818 ENCOUNTER FOR OTHER PREPROCEDURAL EXAMINATION: ICD-10-CM

## 2021-08-27 ENCOUNTER — APPOINTMENT (OUTPATIENT)
Dept: DISASTER EMERGENCY | Facility: CLINIC | Age: 61
End: 2021-08-27

## 2021-08-28 LAB — SARS-COV-2 N GENE NPH QL NAA+PROBE: NOT DETECTED

## 2021-10-04 ENCOUNTER — APPOINTMENT (OUTPATIENT)
Dept: ENDOCRINOLOGY | Facility: CLINIC | Age: 61
End: 2021-10-04

## 2022-01-03 ENCOUNTER — NON-APPOINTMENT (OUTPATIENT)
Age: 62
End: 2022-01-03

## 2022-01-10 ENCOUNTER — APPOINTMENT (OUTPATIENT)
Dept: OBGYN | Facility: CLINIC | Age: 62
End: 2022-01-10
Payer: COMMERCIAL

## 2022-01-10 VITALS
WEIGHT: 169 LBS | BODY MASS INDEX: 31.1 KG/M2 | SYSTOLIC BLOOD PRESSURE: 142 MMHG | DIASTOLIC BLOOD PRESSURE: 80 MMHG | HEIGHT: 62 IN

## 2022-01-10 DIAGNOSIS — Z01.419 ENCOUNTER FOR GYNECOLOGICAL EXAMINATION (GENERAL) (ROUTINE) W/OUT ABNORMAL FINDINGS: ICD-10-CM

## 2022-01-10 PROCEDURE — 99396 PREV VISIT EST AGE 40-64: CPT

## 2022-01-10 NOTE — HISTORY OF PRESENT ILLNESS
[FreeTextEntry1] : no problems \par  [Mammogramdate] : 2018  [PapSmeardate] : 2021 [ColonoscopyDate] : UTD

## 2022-04-11 ENCOUNTER — APPOINTMENT (OUTPATIENT)
Dept: ORTHOPEDIC SURGERY | Facility: CLINIC | Age: 62
End: 2022-04-11

## 2022-05-23 ENCOUNTER — APPOINTMENT (OUTPATIENT)
Dept: ORTHOPEDIC SURGERY | Facility: CLINIC | Age: 62
End: 2022-05-23

## 2022-06-01 ENCOUNTER — RESULT CHARGE (OUTPATIENT)
Age: 62
End: 2022-06-01

## 2022-06-01 ENCOUNTER — APPOINTMENT (OUTPATIENT)
Dept: OBGYN | Facility: CLINIC | Age: 62
End: 2022-06-01

## 2022-06-01 ENCOUNTER — NON-APPOINTMENT (OUTPATIENT)
Age: 62
End: 2022-06-01

## 2022-06-01 LAB
BILIRUB UR QL STRIP: NEGATIVE
CLARITY UR: CLEAR
COLLECTION METHOD: NORMAL
GLUCOSE UR-MCNC: NEGATIVE
HCG UR QL: 1 EU/DL
HGB UR QL STRIP.AUTO: NORMAL
KETONES UR-MCNC: NEGATIVE
LEUKOCYTE ESTERASE UR QL STRIP: NORMAL
NITRITE UR QL STRIP: NEGATIVE
PH UR STRIP: 7
PROT UR STRIP-MCNC: NEGATIVE
SP GR UR STRIP: 1.01

## 2022-06-03 ENCOUNTER — NON-APPOINTMENT (OUTPATIENT)
Age: 62
End: 2022-06-03

## 2022-06-07 LAB — BACTERIA UR CULT: ABNORMAL

## 2022-06-10 ENCOUNTER — APPOINTMENT (OUTPATIENT)
Dept: ORTHOPEDIC SURGERY | Facility: CLINIC | Age: 62
End: 2022-06-10

## 2022-06-10 ENCOUNTER — EMERGENCY (EMERGENCY)
Facility: HOSPITAL | Age: 62
LOS: 1 days | Discharge: HOME | End: 2022-06-10
Attending: EMERGENCY MEDICINE
Payer: COMMERCIAL

## 2022-06-10 VITALS
DIASTOLIC BLOOD PRESSURE: 78 MMHG | SYSTOLIC BLOOD PRESSURE: 143 MMHG | OXYGEN SATURATION: 97 % | RESPIRATION RATE: 18 BRPM | TEMPERATURE: 101 F | HEART RATE: 103 BPM

## 2022-06-10 VITALS
WEIGHT: 162.04 LBS | HEIGHT: 63.5 IN | RESPIRATION RATE: 19 BRPM | TEMPERATURE: 99 F | HEART RATE: 96 BPM | SYSTOLIC BLOOD PRESSURE: 123 MMHG | OXYGEN SATURATION: 94 % | DIASTOLIC BLOOD PRESSURE: 73 MMHG

## 2022-06-10 LAB
ALBUMIN SERPL ELPH-MCNC: 4.4 G/DL — SIGNIFICANT CHANGE UP (ref 3.3–5)
ALP SERPL-CCNC: 62 U/L — SIGNIFICANT CHANGE UP (ref 40–120)
ALT FLD-CCNC: 21 U/L — SIGNIFICANT CHANGE UP (ref 10–45)
ANION GAP SERPL CALC-SCNC: 12 MMOL/L — SIGNIFICANT CHANGE UP (ref 5–17)
AST SERPL-CCNC: 22 U/L — SIGNIFICANT CHANGE UP (ref 10–40)
BASE EXCESS BLDV CALC-SCNC: 0.4 MMOL/L — SIGNIFICANT CHANGE UP (ref -2–2)
BASOPHILS # BLD AUTO: 0 K/UL — SIGNIFICANT CHANGE UP (ref 0–0.2)
BASOPHILS NFR BLD AUTO: 0 % — SIGNIFICANT CHANGE UP (ref 0–2)
BILIRUB SERPL-MCNC: 0.3 MG/DL — SIGNIFICANT CHANGE UP (ref 0.2–1.2)
BUN SERPL-MCNC: 12 MG/DL — SIGNIFICANT CHANGE UP (ref 7–23)
CA-I SERPL-SCNC: 1.25 MMOL/L — SIGNIFICANT CHANGE UP (ref 1.15–1.33)
CALCIUM SERPL-MCNC: 9.4 MG/DL — SIGNIFICANT CHANGE UP (ref 8.4–10.5)
CHLORIDE BLDV-SCNC: 97 MMOL/L — SIGNIFICANT CHANGE UP (ref 96–108)
CHLORIDE SERPL-SCNC: 98 MMOL/L — SIGNIFICANT CHANGE UP (ref 96–108)
CO2 BLDV-SCNC: 27 MMOL/L — HIGH (ref 22–26)
CO2 SERPL-SCNC: 23 MMOL/L — SIGNIFICANT CHANGE UP (ref 22–31)
CREAT SERPL-MCNC: 0.81 MG/DL — SIGNIFICANT CHANGE UP (ref 0.5–1.3)
EGFR: 82 ML/MIN/1.73M2 — SIGNIFICANT CHANGE UP
ELLIPTOCYTES BLD QL SMEAR: SIGNIFICANT CHANGE UP
EOSINOPHIL # BLD AUTO: 0 K/UL — SIGNIFICANT CHANGE UP (ref 0–0.5)
EOSINOPHIL NFR BLD AUTO: 0 % — SIGNIFICANT CHANGE UP (ref 0–6)
GAS PNL BLDV: 130 MMOL/L — LOW (ref 136–145)
GAS PNL BLDV: SIGNIFICANT CHANGE UP
GAS PNL BLDV: SIGNIFICANT CHANGE UP
GLUCOSE BLDV-MCNC: 89 MG/DL — SIGNIFICANT CHANGE UP (ref 70–99)
GLUCOSE SERPL-MCNC: 94 MG/DL — SIGNIFICANT CHANGE UP (ref 70–99)
HADV DNA SPEC QL NAA+PROBE: DETECTED
HCO3 BLDV-SCNC: 26 MMOL/L — SIGNIFICANT CHANGE UP (ref 22–29)
HCT VFR BLD CALC: 34.6 % — SIGNIFICANT CHANGE UP (ref 34.5–45)
HCT VFR BLDA CALC: 36 % — SIGNIFICANT CHANGE UP (ref 34.5–46.5)
HGB BLD CALC-MCNC: 11.9 G/DL — SIGNIFICANT CHANGE UP (ref 11.7–16.1)
HGB BLD-MCNC: 11.2 G/DL — LOW (ref 11.5–15.5)
LACTATE BLDV-MCNC: 1 MMOL/L — SIGNIFICANT CHANGE UP (ref 0.7–2)
LYMPHOCYTES # BLD AUTO: 0.48 K/UL — LOW (ref 1–3.3)
LYMPHOCYTES # BLD AUTO: 8.9 % — LOW (ref 13–44)
MANUAL SMEAR VERIFICATION: SIGNIFICANT CHANGE UP
MCHC RBC-ENTMCNC: 27.3 PG — SIGNIFICANT CHANGE UP (ref 27–34)
MCHC RBC-ENTMCNC: 32.4 GM/DL — SIGNIFICANT CHANGE UP (ref 32–36)
MCV RBC AUTO: 84.2 FL — SIGNIFICANT CHANGE UP (ref 80–100)
MONOCYTES # BLD AUTO: 0.66 K/UL — SIGNIFICANT CHANGE UP (ref 0–0.9)
MONOCYTES NFR BLD AUTO: 12.4 % — SIGNIFICANT CHANGE UP (ref 2–14)
NEUTROPHILS # BLD AUTO: 4.2 K/UL — SIGNIFICANT CHANGE UP (ref 1.8–7.4)
NEUTROPHILS NFR BLD AUTO: 74.3 % — SIGNIFICANT CHANGE UP (ref 43–77)
NEUTS BAND # BLD: 4.4 % — SIGNIFICANT CHANGE UP (ref 0–8)
PCO2 BLDV: 45 MMHG — HIGH (ref 39–42)
PH BLDV: 7.37 — SIGNIFICANT CHANGE UP (ref 7.32–7.43)
PLAT MORPH BLD: NORMAL — SIGNIFICANT CHANGE UP
PLATELET # BLD AUTO: 163 K/UL — SIGNIFICANT CHANGE UP (ref 150–400)
PO2 BLDV: 38 MMHG — SIGNIFICANT CHANGE UP (ref 25–45)
POIKILOCYTOSIS BLD QL AUTO: SIGNIFICANT CHANGE UP
POLYCHROMASIA BLD QL SMEAR: SLIGHT — SIGNIFICANT CHANGE UP
POTASSIUM BLDV-SCNC: 3.6 MMOL/L — SIGNIFICANT CHANGE UP (ref 3.5–5.1)
POTASSIUM SERPL-MCNC: 3.6 MMOL/L — SIGNIFICANT CHANGE UP (ref 3.5–5.3)
POTASSIUM SERPL-SCNC: 3.6 MMOL/L — SIGNIFICANT CHANGE UP (ref 3.5–5.3)
PROT SERPL-MCNC: 6.6 G/DL — SIGNIFICANT CHANGE UP (ref 6–8.3)
RAPID RVP RESULT: DETECTED
RBC # BLD: 4.11 M/UL — SIGNIFICANT CHANGE UP (ref 3.8–5.2)
RBC # FLD: 13.5 % — SIGNIFICANT CHANGE UP (ref 10.3–14.5)
RBC BLD AUTO: ABNORMAL
SAO2 % BLDV: 56.8 % — LOW (ref 67–88)
SARS-COV-2 RNA SPEC QL NAA+PROBE: DETECTED
SCHISTOCYTES BLD QL AUTO: SLIGHT — SIGNIFICANT CHANGE UP
SODIUM SERPL-SCNC: 133 MMOL/L — LOW (ref 135–145)
WBC # BLD: 5.34 K/UL — SIGNIFICANT CHANGE UP (ref 3.8–10.5)
WBC # FLD AUTO: 5.34 K/UL — SIGNIFICANT CHANGE UP (ref 3.8–10.5)

## 2022-06-10 PROCEDURE — 99285 EMERGENCY DEPT VISIT HI MDM: CPT | Mod: 25

## 2022-06-10 PROCEDURE — 99285 EMERGENCY DEPT VISIT HI MDM: CPT

## 2022-06-10 PROCEDURE — 85014 HEMATOCRIT: CPT

## 2022-06-10 PROCEDURE — 82330 ASSAY OF CALCIUM: CPT

## 2022-06-10 PROCEDURE — 80053 COMPREHEN METABOLIC PANEL: CPT

## 2022-06-10 PROCEDURE — 96374 THER/PROPH/DIAG INJ IV PUSH: CPT

## 2022-06-10 PROCEDURE — 93005 ELECTROCARDIOGRAM TRACING: CPT

## 2022-06-10 PROCEDURE — 85025 COMPLETE CBC W/AUTO DIFF WBC: CPT

## 2022-06-10 PROCEDURE — 71045 X-RAY EXAM CHEST 1 VIEW: CPT

## 2022-06-10 PROCEDURE — 82435 ASSAY OF BLOOD CHLORIDE: CPT

## 2022-06-10 PROCEDURE — 84132 ASSAY OF SERUM POTASSIUM: CPT

## 2022-06-10 PROCEDURE — 82803 BLOOD GASES ANY COMBINATION: CPT

## 2022-06-10 PROCEDURE — 82947 ASSAY GLUCOSE BLOOD QUANT: CPT

## 2022-06-10 PROCEDURE — 83605 ASSAY OF LACTIC ACID: CPT

## 2022-06-10 PROCEDURE — 36415 COLL VENOUS BLD VENIPUNCTURE: CPT

## 2022-06-10 PROCEDURE — 84295 ASSAY OF SERUM SODIUM: CPT

## 2022-06-10 PROCEDURE — 85018 HEMOGLOBIN: CPT

## 2022-06-10 PROCEDURE — 96375 TX/PRO/DX INJ NEW DRUG ADDON: CPT

## 2022-06-10 PROCEDURE — 0225U NFCT DS DNA&RNA 21 SARSCOV2: CPT

## 2022-06-10 PROCEDURE — 71045 X-RAY EXAM CHEST 1 VIEW: CPT | Mod: 26

## 2022-06-10 RX ORDER — SODIUM CHLORIDE 9 MG/ML
1000 INJECTION INTRAMUSCULAR; INTRAVENOUS; SUBCUTANEOUS ONCE
Refills: 0 | Status: COMPLETED | OUTPATIENT
Start: 2022-06-10 | End: 2022-06-10

## 2022-06-10 RX ORDER — METOCLOPRAMIDE HCL 10 MG
10 TABLET ORAL ONCE
Refills: 0 | Status: COMPLETED | OUTPATIENT
Start: 2022-06-10 | End: 2022-06-10

## 2022-06-10 RX ORDER — KETOROLAC TROMETHAMINE 30 MG/ML
15 SYRINGE (ML) INJECTION ONCE
Refills: 0 | Status: DISCONTINUED | OUTPATIENT
Start: 2022-06-10 | End: 2022-06-10

## 2022-06-10 RX ORDER — ACETAMINOPHEN 500 MG
650 TABLET ORAL ONCE
Refills: 0 | Status: COMPLETED | OUTPATIENT
Start: 2022-06-10 | End: 2022-06-10

## 2022-06-10 RX ADMIN — Medication 10 MILLIGRAM(S): at 12:55

## 2022-06-10 RX ADMIN — SODIUM CHLORIDE 1000 MILLILITER(S): 9 INJECTION INTRAMUSCULAR; INTRAVENOUS; SUBCUTANEOUS at 12:55

## 2022-06-10 RX ADMIN — Medication 15 MILLIGRAM(S): at 13:04

## 2022-06-10 NOTE — ED ADULT NURSE NOTE - OBJECTIVE STATEMENT
63yo F aaox4 h/o HLD, x3 covid vaccinated, presents to Ed from home c/o generalized body aches/weakness , fever, HA, cough ,  as per pt about 3-4 days started with generalized weakness, "little" non productive cough, fever, HA, reports taking ibuprofen w/o any relief, Pt took a rapid covid test yesterday w/o positive results, on examinations Pt denies CP, SOB, vision changes, n/v/d, abdominal pain,  symptoms Safety and comfort measures initiated- bed placed in lowest position and side rails raised. Pt oriented to call bell system.

## 2022-06-10 NOTE — ED PROVIDER NOTE - CHIEF COMPLAINT
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

## 2022-06-10 NOTE — ED PROVIDER NOTE - PATIENT PORTAL LINK FT
You can access the FollowMyHealth Patient Portal offered by [facility] by registering at the following website: [email or web address]. By joining [employer]’s FollowMyHealth portal, you will also be able to view your health information using other applications (apps) compatible with our system.

## 2022-06-10 NOTE — ED PROVIDER NOTE - NS ED ROS FT
GENERAL: + fever + chills  EYES: No change in vision  HEENT: No trouble swallowing or speaking  CARDIAC: No chest pain  PULMONARY: + cough no SOB  GI: No abdominal pain, no nausea or no vomiting, no diarrhea or constipation  : No changes in urination  SKIN: No rashes  NEURO: No headache, no numbness  MSK: No joint pain  Otherwise as HPI or negative.

## 2022-06-10 NOTE — ED PROVIDER NOTE - PHYSICAL EXAMINATION
General: WN/WD NAD  Head: Atraumatic  Eyes: EOM grossly in tact, no scleral icterus  ENT: moist mucous membranes  Neurology: A&Ox3, nonfocal, BRAVO x 4  Respiratory: normal respiratory effort, CTA B/L  CV: Extremities warm and well perfused  Abdominal: Soft, non-distended  Extremities: No edema  Skin: No rashes

## 2022-06-10 NOTE — ED PROVIDER NOTE - OBJECTIVE STATEMENT
63 y/o f presenting for chills, cough, malaise. States 2 days of cough, chills, decreased energy, fever, headache with minimal improvement with ibuprofen. Denies any abdominal pain, nausea, vomiting, chest pain, SOB.with pmhx anxiety disorder, presents for eval of body aches, not feeling well. Patient states "I feel like I got run over by a truck". no energy, body aches. states he has a "little" non productive cough, fever, HA, taking IBU with some relief. Pt took a rapid covid test yesterday w/o positive results, Patient  denies CP, SOB, vision changes,  no vomiting no diarrhea. no ad pain. no leg swelling.

## 2022-06-10 NOTE — ED PROVIDER NOTE - CLINICAL SUMMARY MEDICAL DECISION MAKING FREE TEXT BOX
ATTG: : body aches, headache and cough, not feeling well concern for viral infection will check labs, check xray check ekg,

## 2022-06-10 NOTE — ED PROVIDER NOTE - ATTENDING CONTRIBUTION TO CARE
61 y/o f with pmhx anxiety disorder, presents for eval of body aches, not feeling well. Patient states "I feel like I got run over by a truck". no energy, body aches. states he has a "little" non productive cough, fever, HA, taking IBU with some relief. Pt took a rapid covid test yesterday w/o positive results, Patient  denies CP, SOB, vision changes,  no vomiting no diarrhea. no ad pain. no leg swelling.   Gen.  no acute distress  HEENT:  perrl eomi  Lungs:  b/l bs  CVS: S1S2   Abd;  soft no guarding no distention no ttp.   Ext: no pitting edema no erythema  Neuro: aaox3, no focal deficits  MSK: moving all ext spon

## 2022-06-10 NOTE — ED PROVIDER NOTE - PROGRESS NOTE DETAILS
Leonardo Charles MD:  Patient re-assessed, requesting DC prior to RVP results, walking saturation 96%

## 2022-06-10 NOTE — ED PROVIDER NOTE - NSFOLLOWUPINSTRUCTIONS_ED_ALL_ED_FT
Viral Respiratory Infection    A viral respiratory infection is an illness that affects parts of the body used for breathing, like the lungs, nose, and throat. It is caused by a germ called a virus. Symptoms can include runny nose, coughing, sneezing, fatigue, body aches, sore throat, fever, or headache. Over the counter medicine can be used to manage the symptoms but the infection typically goes away on its own in 5 to 10 days.     SEEK IMMEDIATE MEDICAL CARE IF YOU HAVE ANY OF THE FOLLOWING SYMPTOMS: shortness of breath, chest pain, fever over 10 days, or lightheadedness/dizziness.    Call back for your viral panel results later today    May return to work when patient is afebrile for 72 hours with no tylenol or motrin or fever reducing medications.      Recommendations for Patients Advised to Self-Isolate for Possible COVID-19 Exposure  We recommend the below precautionary steps from now until 14 days from when you returned from your travel or date of your last known possible contact:  ? Do not go to work, school, or public areas. Avoid using public transportation, ride-sharing, or taxis.  ? As much as possible, separate yourself from other people in your home. If you can, you should stay in a room and away from other people in your home. Also, you should use a separate bathroom, if available.  ? Wear the supplied mask whenever you are around other people.  ? If you have a non-urgent medical appointment, please reschedule for a later date. If the appointment is urgent, please call the healthcare provider and tell them that you are on selfisolation for possible COVID-19. This will help the healthcare provider’s office take steps to keep other people from getting infected or exposed. If you can reschedule routine appointments, do so.  ? Wash your hands often with soap and water for at least 15 to 20 seconds or clean your hands with an alcohol-based hand  that contains 60 to 95% alcohol, covering all surfaces of your hands and rubbing them together until they feel dry. Soap and water should be used preferentially if hands are visibly dirty.  ? Cover your mouth and nose with a tissue when you cough or sneeze. Throw used tissues in a lined trash can; immediately wash your hands.  ? Avoid touching your eyes, nose, and mouth with your hands.  ? Avoid sharing personal household items. You should not share dishes, drinking glasses, cups, eating utensils, towels, or bedding with other people or pets in your home. After using these items, they should be washed thoroughly with soap and water.  ? Clean and disinfect all “high-touch” surfaces every day. High touch surfaces include counters, tabletops, doorknobs, light switches, remote controls, bathroom fixtures, toilets, phones, keyboards, tablets, and bedside tables. Also, clean any surfaces that may have blood, stool, or body fluids on them

## 2022-07-12 ENCOUNTER — APPOINTMENT (OUTPATIENT)
Dept: ORTHOPEDIC SURGERY | Facility: CLINIC | Age: 62
End: 2022-07-12
Payer: COMMERCIAL

## 2022-07-12 VITALS — BODY MASS INDEX: 31.1 KG/M2 | HEIGHT: 62 IN | WEIGHT: 169 LBS

## 2022-07-12 DIAGNOSIS — M65.831 OTHER SYNOVITIS AND TENOSYNOVITIS, RIGHT FOREARM: ICD-10-CM

## 2022-07-12 DIAGNOSIS — S63.591A OTHER SPECIFIED SPRAIN OF RIGHT WRIST, INITIAL ENCOUNTER: ICD-10-CM

## 2022-07-12 PROCEDURE — 73110 X-RAY EXAM OF WRIST: CPT | Mod: RT

## 2022-07-12 PROCEDURE — 20605 DRAIN/INJ JOINT/BURSA W/O US: CPT | Mod: RT

## 2022-07-12 PROCEDURE — 99213 OFFICE O/P EST LOW 20 MIN: CPT | Mod: 25

## 2022-07-12 PROCEDURE — 99214 OFFICE O/P EST MOD 30 MIN: CPT | Mod: 25

## 2022-07-12 PROCEDURE — 73080 X-RAY EXAM OF ELBOW: CPT | Mod: RT

## 2022-07-12 PROCEDURE — J3490M: CUSTOM

## 2022-07-12 PROCEDURE — 20551 NJX 1 TENDON ORIGIN/INSJ: CPT | Mod: RT

## 2022-07-13 PROBLEM — S63.591A COMPLEX TEAR OF TRIANGULAR FIBROCARTILAGE OF RIGHT WRIST, INITIAL ENCOUNTER: Status: ACTIVE | Noted: 2022-07-13

## 2022-07-13 NOTE — HISTORY OF PRESENT ILLNESS
[Right Arm] : right arm [5] : 5 [0] : 0 [Dull/Aching] : dull/aching [de-identified] : She is here for R elbow and R wrist pain\par  [de-identified] : none

## 2022-07-13 NOTE — DISCUSSION/SUMMARY
[de-identified] : Lateral epicondyle injection was performed because of pain inflammation and stiffness\par Anesthesia: ethyl chloride sprayed topically\par Celestone: An injection of Celestone 1cc\par Lidocaine: An injection of Lidocaine 1% 1cc\par Marcaine: An injection of Marcaine 0.5% 1cc\par \par Patient has tried OTC's including aspirin, Ibuprofen, Aleve etc or prescription NSAIDS, and/or exercises at home and/ or\par physical therapy without satisfactory response.\par After verbal consent using sterile preparation and technique. The risks, benefits, and alternatives to cortisone injection\par were explained in full to the patient. Risks outlined include but are not limited to infection, sepsis, bleeding, scarring, skin\par discoloration, temporary increase in pain, syncopal episode, failure to resolve symptoms, allergic reaction, symptom\par recurrence, and elevation of blood sugar in diabetics. Patient understood the risks. All questions were answered. After\par discussion of options, patient requested an injection. Oral informed consent was obtained and sterile prep was done of the\par injection site. Sterile technique was utilized for the procedure including the preparation of the solutions used for the\par injection. Patient tolerated the procedure well. Advised to ice the injection site this evening.\par Prep with betadine locally to site. Sterile technique used

## 2022-07-13 NOTE — PHYSICAL EXAM
[de-identified] : R elbow \par palp bone spur at the lateral epicondyle\par Tender\par Good ROM\par \par Xrays normal\par \par R wrist:\par Swelling\par Tender TFCC\par Pain with pronation/supination\par Decreased wrist ROM\par +TFCC grind\par \par Xrays calcification at the TFCC

## 2022-07-13 NOTE — ASSESSMENT
[FreeTextEntry1] : Wrist injection was performed because of pain inflammation and stiffness\par Anesthesia: ethyl chloride sprayed topically\par Celestone: An injection of Celestone 1cc\par Lidocaine: An injection of Lidocaine 1% 1cc\par Marcaine: An injection of Marcaine 0.5% 1cc\par \par Patient has tried OTC's including aspirin, Ibuprofen, Aleve etc or prescription NSAIDS, and/or exercises at home and/ or\par physical therapy without satisfactory response.\par After verbal consent using sterile preparation and technique. The risks, benefits, and alternatives to cortisone injection\par were explained in full to the patient. Risks outlined include but are not limited to infection, sepsis, bleeding, scarring, skin\par discoloration, temporary increase in pain, syncopal episode, failure to resolve symptoms, allergic reaction, symptom\par recurrence, and elevation of blood sugar in diabetics. Patient understood the risks. All questions were answered. After\par discussion of options, patient requested an injection. Oral informed consent was obtained and sterile prep was done of the\par injection site. Sterile technique was utilized for the procedure including the preparation of the solutions used for the\par injection. Patient tolerated the procedure well. Advised to ice the injection site this evening.\par Prep with betadine locally to site. Sterile technique used

## 2022-09-01 ENCOUNTER — RX RENEWAL (OUTPATIENT)
Age: 62
End: 2022-09-01

## 2022-10-03 ENCOUNTER — APPOINTMENT (OUTPATIENT)
Dept: ORTHOPEDIC SURGERY | Facility: CLINIC | Age: 62
End: 2022-10-03

## 2022-10-03 VITALS — BODY MASS INDEX: 31.1 KG/M2 | HEIGHT: 62 IN | WEIGHT: 169 LBS

## 2022-10-03 PROCEDURE — 99213 OFFICE O/P EST LOW 20 MIN: CPT

## 2022-10-04 ENCOUNTER — FORM ENCOUNTER (OUTPATIENT)
Age: 62
End: 2022-10-04

## 2022-10-04 NOTE — PHYSICAL EXAM
[de-identified] : R shoulder:\par +swelling\par Decreased ROM in Add, abd, IR/ER, FF\par +impingement\par +drop arm\par \par Xrays neg\par \par R elbow:\par mild swelling\par Tender lateral epicondyle\par Pain with ROM\par Pain with forced wrist extension\par \par Xrays neg

## 2022-10-04 NOTE — HISTORY OF PRESENT ILLNESS
[Right Arm] : right arm [8] : 8 [6] : 6 [Dull/Aching] : dull/aching [Throbbing] : throbbing [de-identified] : \par R shoulder and elbow severe pain [FreeTextEntry1] : right elbow  [FreeTextEntry5] : pain is worse\par  [de-identified] : none

## 2022-10-04 NOTE — ASSESSMENT
[FreeTextEntry1] : I rec MRIs shoulder and elbow\par Return after MRIs- consider surgical intervention

## 2022-10-05 ENCOUNTER — APPOINTMENT (OUTPATIENT)
Dept: MRI IMAGING | Facility: CLINIC | Age: 62
End: 2022-10-05

## 2022-10-05 PROCEDURE — 73221 MRI JOINT UPR EXTREM W/O DYE: CPT | Mod: RT

## 2022-10-11 ENCOUNTER — FORM ENCOUNTER (OUTPATIENT)
Age: 62
End: 2022-10-11

## 2022-10-12 ENCOUNTER — APPOINTMENT (OUTPATIENT)
Dept: MRI IMAGING | Facility: CLINIC | Age: 62
End: 2022-10-12

## 2022-10-12 PROCEDURE — 73221 MRI JOINT UPR EXTREM W/O DYE: CPT | Mod: RT

## 2022-10-17 ENCOUNTER — APPOINTMENT (OUTPATIENT)
Dept: ORTHOPEDIC SURGERY | Facility: CLINIC | Age: 62
End: 2022-10-17

## 2022-10-17 VITALS — BODY MASS INDEX: 31.1 KG/M2 | HEIGHT: 62 IN | WEIGHT: 169 LBS

## 2022-10-17 DIAGNOSIS — M75.01 ADHESIVE CAPSULITIS OF RIGHT SHOULDER: ICD-10-CM

## 2022-10-17 PROCEDURE — J3490M: CUSTOM

## 2022-10-17 PROCEDURE — 99214 OFFICE O/P EST MOD 30 MIN: CPT | Mod: 25

## 2022-10-17 PROCEDURE — 20610 DRAIN/INJ JOINT/BURSA W/O US: CPT | Mod: RT

## 2022-10-17 NOTE — REVIEW OF SYSTEMS
12/18/19 1100   Anxiety Tool TRACY - 7   Feeling Nervous, Anxious, or on Edge 3 - Nearly every day   Not Being Able to Stop or Control Worrying 3 - Nearly every day   Worrying Too Much About Different Things 3 - Nearly every day   Trouble Relaxing 2 - More than half the days   Being So Restless it is Hard to Sit Still 1 - Several days   Becoming Easily Annoyed or Irritable 2 - More than half the days   Feeling Afraid as if Something Awful Might Happen 3 - Nearly every day   Total Score 17   Isabel Knapp, LPC [Joint Pain] : joint pain [Joint Stiffness] : joint stiffness [Negative] : Heme/Lymph

## 2022-10-18 PROBLEM — M75.01 ADHESIVE CAPSULITIS OF RIGHT SHOULDER: Status: ACTIVE | Noted: 2022-10-18

## 2022-10-18 NOTE — PHYSICAL EXAM
[de-identified] : R shoulder:\par +swelling\par Decreased ROM in Add, abd, IR/ER, FF\par +impingement\par \par \par R elbow:\par mild swelling\par Tender lateral epicondyle\par Pain with ROM\par Pain with forced wrist extension\par \par

## 2022-10-18 NOTE — ASSESSMENT
[FreeTextEntry1] : Shoulder injection was performed because of pain inflammation\par Anesthesia: ethyl chloride sprayed topically\par Celestone: An injection of Celestone 2 cc\par Lidocaine: An injection of Lidocaine 1% 2 cc\par Marcaine: An injection of Marcaine 0.5% 2 cc\par \par Patient has tried OTC's including aspirin, Ibuprofen, Aleve etc or prescription NSAIDS, and/or exercises at home and/ or\par physical therapy without satisfactory response.\par After verbal consent using sterile preparation and technique. The risks, benefits, and alternatives to cortisone injection\par were explained in full to the patient. Risks outlined include but are not limited to infection, sepsis, bleeding, scarring, skin\par discoloration, temporary increase in pain, syncopal episode, failure to resolve symptoms, allergic reaction, symptom\par recurrence, and elevation of blood sugar in diabetics. Patient understood the risks. All questions were answered. After\par discussion of options, patient requested an injection. Oral informed consent was obtained and sterile prep was done of the\par injection site. Sterile technique was utilized for the procedure including the preparation of the solutions used for the\par injection. Patient tolerated the procedure well. Advised to ice the injection site this evening.\par Prep with betadine locally to site. Sterile technique used

## 2022-10-18 NOTE — DISCUSSION/SUMMARY
[de-identified] : For R elbow lateral ext tendon repair \par R/B/A of surgery discussed with the patient. Risks including but not limited to infection, nerve damage, tendon damage, pain, stiffness, recurrence, no resolution of symptoms, loss of function, limb or life. They understand and agree to surgery \par Return post op

## 2022-10-18 NOTE — HISTORY OF PRESENT ILLNESS
[6] : 6 [2] : 2 [Dull/Aching] : dull/aching [FreeTextEntry1] : r elbow,shoulder [de-identified] : R shoulder MRI shows labral tear, adhesive capsulitis\par R elbow MRI shows complete tear ext mass [de-identified] : none

## 2022-11-07 RX ORDER — OXYCODONE AND ACETAMINOPHEN 5; 325 MG/1; MG/1
5-325 TABLET ORAL EVERY 4 HOURS
Qty: 30 | Refills: 0 | Status: ACTIVE | COMMUNITY
Start: 2022-11-07 | End: 1900-01-01

## 2022-11-12 ENCOUNTER — NON-APPOINTMENT (OUTPATIENT)
Age: 62
End: 2022-11-12

## 2022-11-16 ENCOUNTER — APPOINTMENT (OUTPATIENT)
Age: 62
End: 2022-11-16

## 2022-11-16 PROCEDURE — 24359 REPAIR ELBOW DEB/ATTCH OPEN: CPT | Mod: RT

## 2022-11-16 PROCEDURE — 29105 APPLICATION LONG ARM SPLINT: CPT | Mod: 59,RT

## 2022-11-16 PROCEDURE — 24359 REPAIR ELBOW DEB/ATTCH OPEN: CPT | Mod: AS,RT

## 2022-11-16 RX ORDER — OXYCODONE AND ACETAMINOPHEN 5; 325 MG/1; MG/1
5-325 TABLET ORAL
Qty: 30 | Refills: 0 | Status: ACTIVE | COMMUNITY
Start: 2022-11-16 | End: 1900-01-01

## 2022-11-22 ENCOUNTER — APPOINTMENT (OUTPATIENT)
Dept: ORTHOPEDIC SURGERY | Facility: CLINIC | Age: 62
End: 2022-11-22

## 2022-11-22 VITALS — HEIGHT: 62 IN | WEIGHT: 169 LBS | BODY MASS INDEX: 31.1 KG/M2

## 2022-11-22 DIAGNOSIS — S56.519A STRAIN OF OTHER EXTENSOR MUSCLE, FASCIA AND TENDON AT FOREARM LEVEL, UNSPECIFIED ARM, INITIAL ENCOUNTER: ICD-10-CM

## 2022-11-22 PROCEDURE — 99024 POSTOP FOLLOW-UP VISIT: CPT

## 2022-11-22 NOTE — PHYSICAL EXAM
[de-identified] : Mild hand swelling\par Healed incision\par No evidence of infection\par Mild tenderness at the surgical site\par Finger stiffness

## 2022-11-22 NOTE — HISTORY OF PRESENT ILLNESS
[6] : 6 [5] : 5 [Dull/Aching] : dull/aching [] : Post Surgical Visit: yes [de-identified] : R lateral Nirschl, ext tendon repair last week\par She has improved pain  [FreeTextEntry1] : R elbow [de-identified] : 11/16/22 [de-identified] : R elbow lateral ext tendon repair

## 2022-12-05 ENCOUNTER — APPOINTMENT (OUTPATIENT)
Dept: ORTHOPEDIC SURGERY | Facility: CLINIC | Age: 62
End: 2022-12-05

## 2022-12-05 VITALS — BODY MASS INDEX: 31.1 KG/M2 | HEIGHT: 62 IN | WEIGHT: 169 LBS

## 2022-12-05 PROCEDURE — 99024 POSTOP FOLLOW-UP VISIT: CPT

## 2022-12-06 NOTE — HISTORY OF PRESENT ILLNESS
[1] : 2 [0] : 0 [Dull/Aching] : dull/aching [] : Post Surgical Visit: yes [de-identified] : R elbow is much better \par R lateral Nirschl 3 weeks ago\par She has no pain  [FreeTextEntry1] : R elbow [de-identified] : brace [de-identified] : 11/16/22 [de-identified] : R elbow lateral nirschl ext tendon repair

## 2022-12-06 NOTE — PHYSICAL EXAM
[de-identified] : Mild elbow swelling\par Healed incision\par No evidence of infection\par No tenderness at the surgical site\par Improved elbow ROM, still has stiffness\par

## 2022-12-06 NOTE — ASSESSMENT
[FreeTextEntry1] : I rec therapy\par Brace opened to 30 deg less than full ext\par No lifting\par Return in 2 weeks- consider d/c brace and start strengthening

## 2022-12-15 ENCOUNTER — NON-APPOINTMENT (OUTPATIENT)
Age: 62
End: 2022-12-15

## 2022-12-19 ENCOUNTER — APPOINTMENT (OUTPATIENT)
Dept: ORTHOPEDIC SURGERY | Facility: CLINIC | Age: 62
End: 2022-12-19

## 2022-12-19 VITALS — WEIGHT: 169 LBS | BODY MASS INDEX: 31.1 KG/M2 | HEIGHT: 62 IN

## 2022-12-19 DIAGNOSIS — M77.11 LATERAL EPICONDYLITIS, RIGHT ELBOW: ICD-10-CM

## 2022-12-19 PROCEDURE — 99024 POSTOP FOLLOW-UP VISIT: CPT

## 2022-12-19 NOTE — HISTORY OF PRESENT ILLNESS
[2] : 2 [0] : 0 [Dull/Aching] : dull/aching [] : Post Surgical Visit: yes [de-identified] : R lateral Nirschl\par She is better [FreeTextEntry1] : right elbow [FreeTextEntry5] : pain is better\par  [de-identified] : brace,PT [de-identified] : 11/16/22 [de-identified] : R elbow lateral nirschl ext tendon repair

## 2022-12-19 NOTE — PHYSICAL EXAM
[de-identified] : Mild elbow swelling\par Healed incision\par No evidence of infection\par No tenderness at the surgical site\par Improved elbow ROM, still has stiffness\par

## 2023-01-23 ENCOUNTER — APPOINTMENT (OUTPATIENT)
Dept: ORTHOPEDIC SURGERY | Facility: CLINIC | Age: 63
End: 2023-01-23

## 2023-03-13 ENCOUNTER — APPOINTMENT (OUTPATIENT)
Dept: ORTHOPEDIC SURGERY | Facility: CLINIC | Age: 63
End: 2023-03-13

## 2023-05-02 ENCOUNTER — APPOINTMENT (OUTPATIENT)
Dept: ORTHOPEDIC SURGERY | Facility: CLINIC | Age: 63
End: 2023-05-02

## 2023-08-16 ENCOUNTER — APPOINTMENT (OUTPATIENT)
Dept: OBGYN | Facility: CLINIC | Age: 63
End: 2023-08-16
Payer: COMMERCIAL

## 2023-08-16 VITALS
DIASTOLIC BLOOD PRESSURE: 79 MMHG | HEIGHT: 62 IN | WEIGHT: 154 LBS | SYSTOLIC BLOOD PRESSURE: 119 MMHG | BODY MASS INDEX: 28.34 KG/M2

## 2023-08-16 DIAGNOSIS — R39.9 UNSPECIFIED SYMPTOMS AND SIGNS INVOLVING THE GENITOURINARY SYSTEM: ICD-10-CM

## 2023-08-16 LAB
BILIRUB UR QL STRIP: NORMAL
GLUCOSE UR-MCNC: NORMAL
HCG UR QL: 0.2 EU/DL
HGB UR QL STRIP.AUTO: NORMAL
KETONES UR-MCNC: NORMAL
LEUKOCYTE ESTERASE UR QL STRIP: NORMAL
NITRITE UR QL STRIP: NORMAL
PH UR STRIP: 7.5
PROT UR STRIP-MCNC: NORMAL
SP GR UR STRIP: 1.01

## 2023-08-16 PROCEDURE — 99213 OFFICE O/P EST LOW 20 MIN: CPT

## 2023-08-16 RX ORDER — NITROFURANTOIN (MONOHYDRATE/MACROCRYSTALS) 25; 75 MG/1; MG/1
100 CAPSULE ORAL
Qty: 10 | Refills: 0 | Status: ACTIVE | COMMUNITY
Start: 2022-06-01 | End: 1900-01-01

## 2023-08-16 NOTE — HISTORY OF PRESENT ILLNESS
[FreeTextEntry1] : Patient is a 63 year old, presenting for c/o urinary urgency X 1-2 weeks without resolve.  Reports she has had urinary symptoms in the past but nothing recent. Also endorses polyuria and pelvic discomfort. Has not taken anything for discomfort. Of note, patient has a L leg fracture (which was sustained 6/23) and has been predominantly wheelchair bound since injury.  GYNHx: Denies abnl pap smears Denies fibroids/endometriosis/cysts Denies STIs

## 2023-08-16 NOTE — PHYSICAL EXAM
[Appropriately responsive] : appropriately responsive [Alert] : alert [No Acute Distress] : no acute distress [Soft] : soft [Non-tender] : non-tender [Non-distended] : non-distended [Oriented x3] : oriented x3 [FreeTextEntry7] : no CVA tenderness

## 2023-08-21 ENCOUNTER — NON-APPOINTMENT (OUTPATIENT)
Age: 63
End: 2023-08-21

## 2023-08-21 LAB — BACTERIA UR CULT: NORMAL

## 2023-09-05 ENCOUNTER — APPOINTMENT (OUTPATIENT)
Dept: ORTHOPEDIC SURGERY | Facility: CLINIC | Age: 63
End: 2023-09-05

## 2023-09-11 ENCOUNTER — APPOINTMENT (OUTPATIENT)
Dept: ORTHOPEDIC SURGERY | Facility: CLINIC | Age: 63
End: 2023-09-11
Payer: COMMERCIAL

## 2023-09-11 VITALS — WEIGHT: 1 LBS | HEIGHT: 62 IN | BODY MASS INDEX: 0.18 KG/M2

## 2023-09-11 DIAGNOSIS — M75.82 OTHER SHOULDER LESIONS, LEFT SHOULDER: ICD-10-CM

## 2023-09-11 DIAGNOSIS — M54.2 CERVICALGIA: ICD-10-CM

## 2023-09-11 DIAGNOSIS — M75.81 OTHER SHOULDER LESIONS, RIGHT SHOULDER: ICD-10-CM

## 2023-09-11 PROCEDURE — J3490M: CUSTOM

## 2023-09-11 PROCEDURE — 99213 OFFICE O/P EST LOW 20 MIN: CPT | Mod: 25

## 2023-09-11 PROCEDURE — 20552 NJX 1/MLT TRIGGER POINT 1/2: CPT

## 2023-09-25 ENCOUNTER — RX RENEWAL (OUTPATIENT)
Age: 63
End: 2023-09-25

## 2023-10-23 ENCOUNTER — RX RENEWAL (OUTPATIENT)
Age: 63
End: 2023-10-23

## 2023-11-02 ENCOUNTER — APPOINTMENT (OUTPATIENT)
Dept: OBGYN | Facility: CLINIC | Age: 63
End: 2023-11-02
Payer: COMMERCIAL

## 2023-11-02 VITALS
DIASTOLIC BLOOD PRESSURE: 78 MMHG | SYSTOLIC BLOOD PRESSURE: 128 MMHG | WEIGHT: 157 LBS | BODY MASS INDEX: 28.89 KG/M2 | HEIGHT: 62 IN

## 2023-11-02 PROCEDURE — 99396 PREV VISIT EST AGE 40-64: CPT

## 2023-11-06 LAB
CYTOLOGY CVX/VAG DOC THIN PREP: ABNORMAL
HPV HIGH+LOW RISK DNA PNL CVX: NOT DETECTED

## 2023-12-04 ENCOUNTER — APPOINTMENT (OUTPATIENT)
Dept: ORTHOPEDIC SURGERY | Facility: CLINIC | Age: 63
End: 2023-12-04
Payer: COMMERCIAL

## 2023-12-04 VITALS — BODY MASS INDEX: 28.89 KG/M2 | WEIGHT: 157 LBS | HEIGHT: 62 IN

## 2023-12-04 DIAGNOSIS — M77.8 OTHER ENTHESOPATHIES, NOT ELSEWHERE CLASSIFIED: ICD-10-CM

## 2023-12-04 DIAGNOSIS — M75.21 BICIPITAL TENDINITIS, RIGHT SHOULDER: ICD-10-CM

## 2023-12-04 DIAGNOSIS — M70.22 OLECRANON BURSITIS, LEFT ELBOW: ICD-10-CM

## 2023-12-04 DIAGNOSIS — M77.02 MEDIAL EPICONDYLITIS, LEFT ELBOW: ICD-10-CM

## 2023-12-04 PROCEDURE — J3490M: CUSTOM

## 2023-12-04 PROCEDURE — 99213 OFFICE O/P EST LOW 20 MIN: CPT | Mod: 25

## 2023-12-04 PROCEDURE — 20551 NJX 1 TENDON ORIGIN/INSJ: CPT

## 2024-01-18 ENCOUNTER — APPOINTMENT (OUTPATIENT)
Dept: ORTHOPEDIC SURGERY | Facility: CLINIC | Age: 64
End: 2024-01-18

## 2024-03-02 ENCOUNTER — NON-APPOINTMENT (OUTPATIENT)
Age: 64
End: 2024-03-02

## 2024-03-15 ENCOUNTER — APPOINTMENT (OUTPATIENT)
Dept: OBGYN | Facility: CLINIC | Age: 64
End: 2024-03-15
Payer: MEDICARE

## 2024-03-15 VITALS — BODY MASS INDEX: 27.51 KG/M2 | SYSTOLIC BLOOD PRESSURE: 116 MMHG | WEIGHT: 150.4 LBS | DIASTOLIC BLOOD PRESSURE: 72 MMHG

## 2024-03-15 DIAGNOSIS — N89.8 OTHER SPECIFIED NONINFLAMMATORY DISORDERS OF VAGINA: ICD-10-CM

## 2024-03-15 LAB
BILIRUB UR QL STRIP: NORMAL
GLUCOSE UR-MCNC: NORMAL
HCG UR QL: 0.2 EU/DL
HGB UR QL STRIP.AUTO: NORMAL
KETONES UR-MCNC: NORMAL
LEUKOCYTE ESTERASE UR QL STRIP: NORMAL
NITRITE UR QL STRIP: NORMAL
PH UR STRIP: 5.5
PROT UR STRIP-MCNC: NORMAL
SP GR UR STRIP: 1.02

## 2024-03-15 PROCEDURE — 99213 OFFICE O/P EST LOW 20 MIN: CPT

## 2024-03-15 RX ORDER — METRONIDAZOLE 500 MG/1
500 TABLET ORAL
Qty: 10 | Refills: 0 | Status: ACTIVE | COMMUNITY
Start: 2024-03-15 | End: 1900-01-01

## 2024-03-15 RX ORDER — TRIAMCINOLONE ACETONIDE 1 MG/G
0.1 CREAM TOPICAL TWICE DAILY
Qty: 1 | Refills: 1 | Status: ACTIVE | COMMUNITY
Start: 2024-03-15 | End: 1900-01-01

## 2024-03-15 NOTE — PLAN
[FreeTextEntry1] : #Vaginal Irritation/Discharge - UA/Urine Culture obtained - Affirm obtained and sent - STI testing offered/declined - prescription for topical and oral treatment sent to pharmacy - Discussed importance of hydration and unrestrictive undergarments. - Will f/u once labs are resulted

## 2024-03-15 NOTE — PHYSICAL EXAM
[Appropriately responsive] : appropriately responsive [Alert] : alert [No Acute Distress] : no acute distress [Soft] : soft [Oriented x3] : oriented x3 [Non-tender] : non-tender [Labia Majora] : normal [Labia Minora Erythema] : erythema [Moderate] : moderate [Discharge] : a  ~M vaginal discharge was present [Foul Smelling] : not foul smelling [White] : white [Thick] : thick [Normal] : normal

## 2024-03-15 NOTE — HISTORY OF PRESENT ILLNESS
[FreeTextEntry1] : Patient is a 64 year old, P1 presenting for c/o vaginal itching x 2 days.  Unsure if she has any unusual discharge but does notice an odor.  Denies any urinary symptoms, fevers, or GI symptoms. Has not taken anything for her discomfort.  Of note, patient states that she recently took abx for prolonged sinus infection.  GYNHx: Denies abnl pap smears Denies fibroids/endometriosis/cysts Denies STIs   SexualHx: 4-6 weeks ago, , no concerns for STIs

## 2024-03-19 LAB
BACTERIA UR CULT: ABNORMAL
CANDIDA VAG CYTO: NOT DETECTED
G VAGINALIS+PREV SP MTYP VAG QL MICRO: NOT DETECTED
T VAGINALIS VAG QL WET PREP: NOT DETECTED

## 2024-04-03 ENCOUNTER — APPOINTMENT (OUTPATIENT)
Dept: INTERNAL MEDICINE | Facility: CLINIC | Age: 64
End: 2024-04-03

## 2024-05-20 ENCOUNTER — APPOINTMENT (OUTPATIENT)
Dept: ORTHOPEDIC SURGERY | Facility: CLINIC | Age: 64
End: 2024-05-20
Payer: MEDICARE

## 2024-05-20 VITALS — WEIGHT: 148 LBS | BODY MASS INDEX: 27.23 KG/M2 | HEIGHT: 62 IN

## 2024-05-20 DIAGNOSIS — M65.332 TRIGGER FINGER, LEFT MIDDLE FINGER: ICD-10-CM

## 2024-05-20 PROCEDURE — 99213 OFFICE O/P EST LOW 20 MIN: CPT | Mod: 25

## 2024-05-20 PROCEDURE — J3490M: CUSTOM | Mod: NC

## 2024-05-20 PROCEDURE — 20550 NJX 1 TENDON SHEATH/LIGAMENT: CPT | Mod: LT

## 2024-05-20 NOTE — PHYSICAL EXAM
[de-identified] : L hand:  Mild swelling  Tender 3rd A1 pulley  Decreased middle ROM  +middle triggering

## 2024-05-20 NOTE — HISTORY OF PRESENT ILLNESS
[de-identified] : L MF trigger Increasing over the past 4-6 weeks [6] : 6 [2] : 2 [Dull/Aching] : dull/aching [Nothing helps with pain getting better] : Nothing helps with pain getting better [FreeTextEntry1] : L hand/ LUIS  [FreeTextEntry5] : she has pain, swelling and left middle is locking  [de-identified] : activity

## 2024-05-20 NOTE — ASSESSMENT
[FreeTextEntry1] : L MF tendon sheath injection was performed because of inflammation  Anesthesia: ethyl chloride sprayed topically Celestone 6mg: An injection of Celestone 1cc Lidocaine: An injection of Lidocaine 1% 1cc Marcaine: An injection of Marcaine 0.5% 1cc   Patient has tried OTC's and HEP without satisfactory response. After verbal consent using sterile preparation and technique. The risks, benefits, and alternatives to cortisone injection were explained in full to the patient. Risks outlined include but are not limited to infection, sepsis, bleeding, scarring, skin discoloration, temporary increase in pain, syncopal episode, failure to resolve symptoms, allergic reaction, symptom recurrence, and elevation of blood sugar in diabetics. Patient understood the risks. All questions were answered. After discussion of options, patient requested an injection. Oral informed consent was obtained and sterile prep was done of the injection site. Sterile technique was utilized for the procedure including the preparation of the solutions used for the injection. Patient tolerated the procedure well. Advised to ice the injection site this evening. Prep with betadine locally to site. Sterile technique used.  I rec therapy for stiffness to improve ROM Splinting at night can be helpful. Ice and NSAIDs

## 2024-07-09 ENCOUNTER — APPOINTMENT (OUTPATIENT)
Dept: ORTHOPEDIC SURGERY | Facility: CLINIC | Age: 64
End: 2024-07-09
Payer: MEDICARE

## 2024-07-09 VITALS — HEIGHT: 62 IN | WEIGHT: 148 LBS | BODY MASS INDEX: 27.23 KG/M2

## 2024-07-09 DIAGNOSIS — M65.341 TRIGGER FINGER, RIGHT RING FINGER: ICD-10-CM

## 2024-07-09 PROCEDURE — J3490M: CUSTOM | Mod: NC,JZ

## 2024-07-09 PROCEDURE — 20550 NJX 1 TENDON SHEATH/LIGAMENT: CPT | Mod: RT

## 2024-07-09 PROCEDURE — 99213 OFFICE O/P EST LOW 20 MIN: CPT | Mod: 25

## 2024-12-17 ENCOUNTER — APPOINTMENT (OUTPATIENT)
Dept: ORTHOPEDIC SURGERY | Facility: CLINIC | Age: 64
End: 2024-12-17
Payer: MEDICARE

## 2024-12-17 DIAGNOSIS — S22.31XA FRACTURE OF ONE RIB, RIGHT SIDE, INITIAL ENCOUNTER FOR CLOSED FRACTURE: ICD-10-CM

## 2024-12-17 PROCEDURE — 99214 OFFICE O/P EST MOD 30 MIN: CPT

## 2024-12-17 PROCEDURE — 71100 X-RAY EXAM RIBS UNI 2 VIEWS: CPT

## 2024-12-17 PROCEDURE — 73030 X-RAY EXAM OF SHOULDER: CPT | Mod: RT

## 2024-12-17 PROCEDURE — L3670: CPT

## 2024-12-17 PROCEDURE — 72040 X-RAY EXAM NECK SPINE 2-3 VW: CPT

## 2024-12-17 RX ORDER — ZOLEDRONIC ACID 5 MG/100ML
INJECTION, SOLUTION INTRAVENOUS
Refills: 0 | Status: ACTIVE | COMMUNITY

## 2024-12-17 RX ORDER — CYCLOBENZAPRINE HYDROCHLORIDE 5 MG/1
5 TABLET, FILM COATED ORAL
Qty: 30 | Refills: 0 | Status: ACTIVE | COMMUNITY
Start: 2024-12-17 | End: 1900-01-01

## 2024-12-17 RX ORDER — ROSUVASTATIN CALCIUM 5 MG/1
TABLET, FILM COATED ORAL
Refills: 0 | Status: ACTIVE | COMMUNITY

## 2024-12-17 RX ORDER — CLONIDINE HYDROCHLORIDE 0.3 MG/1
TABLET ORAL
Refills: 0 | Status: ACTIVE | COMMUNITY

## 2024-12-29 NOTE — PLAN
[FreeTextEntry1] : #Urinary Complaints - UA/Urine Culture obtained - Prescription for broad spectrum antibiotic sent to preferred pharmacy as discussed. - Reviewed OTC options for symptom management. - Discussed importance of adequate hydration - Will f/u once labs are resulted  - Patient to make annual appointment at her convenience - All other questions and concerns addressed at this time.
No

## 2025-01-08 ENCOUNTER — APPOINTMENT (OUTPATIENT)
Dept: ORTHOPEDIC SURGERY | Facility: CLINIC | Age: 65
End: 2025-01-08
Payer: MEDICARE

## 2025-01-08 DIAGNOSIS — S22.31XD FRACTURE OF ONE RIB, RIGHT SIDE, SUBSEQUENT ENCOUNTER FOR FRACTURE WITH ROUTINE HEALING: ICD-10-CM

## 2025-01-08 DIAGNOSIS — S40.011A CONTUSION OF RIGHT SHOULDER, INITIAL ENCOUNTER: ICD-10-CM

## 2025-01-08 DIAGNOSIS — S22.31XA FRACTURE OF ONE RIB, RIGHT SIDE, INITIAL ENCOUNTER FOR CLOSED FRACTURE: ICD-10-CM

## 2025-01-08 PROCEDURE — 99213 OFFICE O/P EST LOW 20 MIN: CPT

## 2025-01-09 ENCOUNTER — APPOINTMENT (OUTPATIENT)
Dept: MRI IMAGING | Facility: CLINIC | Age: 65
End: 2025-01-09

## 2025-01-09 PROCEDURE — 73221 MRI JOINT UPR EXTREM W/O DYE: CPT | Mod: RT

## 2025-01-13 ENCOUNTER — APPOINTMENT (OUTPATIENT)
Dept: ORTHOPEDIC SURGERY | Facility: CLINIC | Age: 65
End: 2025-01-13

## 2025-01-13 VITALS — HEIGHT: 62 IN | WEIGHT: 148 LBS | BODY MASS INDEX: 27.23 KG/M2

## 2025-01-13 DIAGNOSIS — M65.341 TRIGGER FINGER, RIGHT RING FINGER: ICD-10-CM

## 2025-01-13 PROCEDURE — 99214 OFFICE O/P EST MOD 30 MIN: CPT

## 2025-01-14 ENCOUNTER — NON-APPOINTMENT (OUTPATIENT)
Age: 65
End: 2025-01-14

## 2025-01-14 RX ORDER — TRAMADOL HYDROCHLORIDE AND ACETAMINOPHEN 37.5; 325 MG/1; MG/1
37.5-325 TABLET, FILM COATED ORAL
Qty: 40 | Refills: 0 | Status: ACTIVE | COMMUNITY
Start: 2025-01-14 | End: 1900-01-01

## 2025-01-22 ENCOUNTER — APPOINTMENT (OUTPATIENT)
Dept: ORTHOPEDIC SURGERY | Facility: CLINIC | Age: 65
End: 2025-01-22
Payer: MEDICARE

## 2025-01-22 VITALS — WEIGHT: 148 LBS | BODY MASS INDEX: 27.23 KG/M2 | HEIGHT: 62 IN

## 2025-01-22 DIAGNOSIS — S42.254A NONDISPLACED FRACTURE OF GREATER TUBEROSITY OF RIGHT HUMERUS, INITIAL ENCOUNTER FOR CLOSED FRACTURE: ICD-10-CM

## 2025-01-22 PROCEDURE — 99213 OFFICE O/P EST LOW 20 MIN: CPT

## 2025-02-18 ENCOUNTER — APPOINTMENT (OUTPATIENT)
Dept: ORTHOPEDIC SURGERY | Facility: CLINIC | Age: 65
End: 2025-02-18

## 2025-02-19 ENCOUNTER — APPOINTMENT (OUTPATIENT)
Dept: ORTHOPEDIC SURGERY | Facility: CLINIC | Age: 65
End: 2025-02-19
Payer: MEDICARE

## 2025-02-19 DIAGNOSIS — S63.92XA SPRAIN OF UNSPECIFIED PART OF LEFT WRIST AND HAND, INITIAL ENCOUNTER: ICD-10-CM

## 2025-02-19 PROCEDURE — 99203 OFFICE O/P NEW LOW 30 MIN: CPT

## 2025-02-19 PROCEDURE — 99213 OFFICE O/P EST LOW 20 MIN: CPT

## 2025-02-19 PROCEDURE — 73130 X-RAY EXAM OF HAND: CPT | Mod: 26,LT

## 2025-02-26 ENCOUNTER — APPOINTMENT (OUTPATIENT)
Dept: ORTHOPEDIC SURGERY | Facility: CLINIC | Age: 65
End: 2025-02-26
Payer: MEDICARE

## 2025-02-26 VITALS — WEIGHT: 144 LBS | HEIGHT: 62 IN | BODY MASS INDEX: 26.5 KG/M2

## 2025-02-26 DIAGNOSIS — S42.254A NONDISPLACED FRACTURE OF GREATER TUBEROSITY OF RIGHT HUMERUS, INITIAL ENCOUNTER FOR CLOSED FRACTURE: ICD-10-CM

## 2025-02-26 PROCEDURE — 23620 CLTX GR HMRL TBRS FX WO MNPJ: CPT | Mod: RT

## 2025-02-26 PROCEDURE — 99213 OFFICE O/P EST LOW 20 MIN: CPT | Mod: 57

## 2025-03-03 ENCOUNTER — APPOINTMENT (OUTPATIENT)
Dept: ORTHOPEDIC SURGERY | Facility: CLINIC | Age: 65
End: 2025-03-03
Payer: MEDICARE

## 2025-03-03 VITALS — BODY MASS INDEX: 26.5 KG/M2 | HEIGHT: 62 IN | WEIGHT: 144 LBS

## 2025-03-03 DIAGNOSIS — S63.502A UNSPECIFIED SPRAIN OF LEFT WRIST, INITIAL ENCOUNTER: ICD-10-CM

## 2025-03-03 PROCEDURE — 99213 OFFICE O/P EST LOW 20 MIN: CPT

## 2025-03-10 RX ORDER — HYDROCODONE BITARTRATE AND ACETAMINOPHEN 5; 325 MG/1; MG/1
5-325 TABLET ORAL
Qty: 10 | Refills: 0 | Status: ACTIVE | COMMUNITY
Start: 2025-03-10 | End: 1900-01-01

## 2025-03-12 ENCOUNTER — APPOINTMENT (OUTPATIENT)
Dept: ORTHOPEDIC SURGERY | Facility: AMBULATORY SURGERY CENTER | Age: 65
End: 2025-03-12
Payer: MEDICARE

## 2025-03-12 PROCEDURE — 26055 INCISE FINGER TENDON SHEATH: CPT | Mod: AS,F8

## 2025-03-12 PROCEDURE — 26055 INCISE FINGER TENDON SHEATH: CPT | Mod: F8

## 2025-03-20 ENCOUNTER — APPOINTMENT (OUTPATIENT)
Dept: ORTHOPEDIC SURGERY | Facility: CLINIC | Age: 65
End: 2025-03-20
Payer: MEDICARE

## 2025-03-20 VITALS — HEIGHT: 62 IN | WEIGHT: 144 LBS | BODY MASS INDEX: 26.5 KG/M2

## 2025-03-20 DIAGNOSIS — M65.341 TRIGGER FINGER, RIGHT RING FINGER: ICD-10-CM

## 2025-03-20 PROCEDURE — 99024 POSTOP FOLLOW-UP VISIT: CPT

## 2025-04-29 ENCOUNTER — APPOINTMENT (OUTPATIENT)
Dept: ORTHOPEDIC SURGERY | Facility: CLINIC | Age: 65
End: 2025-04-29
Payer: MEDICARE

## 2025-04-29 VITALS — WEIGHT: 150 LBS | HEIGHT: 62 IN | BODY MASS INDEX: 27.6 KG/M2

## 2025-04-29 DIAGNOSIS — S42.254G: ICD-10-CM

## 2025-04-29 PROCEDURE — 73030 X-RAY EXAM OF SHOULDER: CPT | Mod: RT

## 2025-04-29 PROCEDURE — 99024 POSTOP FOLLOW-UP VISIT: CPT

## 2025-05-05 ENCOUNTER — APPOINTMENT (OUTPATIENT)
Dept: MRI IMAGING | Facility: CLINIC | Age: 65
End: 2025-05-05
Payer: MEDICARE

## 2025-05-05 PROCEDURE — 73221 MRI JOINT UPR EXTREM W/O DYE: CPT | Mod: RT

## 2025-05-14 ENCOUNTER — APPOINTMENT (OUTPATIENT)
Dept: ORTHOPEDIC SURGERY | Facility: CLINIC | Age: 65
End: 2025-05-14

## 2025-05-14 VITALS — HEIGHT: 62 IN | BODY MASS INDEX: 27.6 KG/M2 | WEIGHT: 150 LBS

## 2025-05-14 DIAGNOSIS — S42.254G: ICD-10-CM

## 2025-05-14 PROCEDURE — 99024 POSTOP FOLLOW-UP VISIT: CPT

## 2025-05-14 PROCEDURE — 20610 DRAIN/INJ JOINT/BURSA W/O US: CPT | Mod: 58,RT

## 2025-06-11 ENCOUNTER — APPOINTMENT (OUTPATIENT)
Dept: ORTHOPEDIC SURGERY | Facility: CLINIC | Age: 65
End: 2025-06-11

## 2025-07-17 ENCOUNTER — APPOINTMENT (OUTPATIENT)
Dept: ORTHOPEDIC SURGERY | Facility: CLINIC | Age: 65
End: 2025-07-17
Payer: MEDICARE

## 2025-07-17 VITALS — WEIGHT: 150 LBS | BODY MASS INDEX: 27.6 KG/M2 | HEIGHT: 62 IN

## 2025-07-17 PROCEDURE — 99213 OFFICE O/P EST LOW 20 MIN: CPT

## 2025-07-18 ENCOUNTER — OUTPATIENT (OUTPATIENT)
Dept: OUTPATIENT SERVICES | Facility: HOSPITAL | Age: 65
LOS: 1 days | End: 2025-07-18
Payer: MEDICARE

## 2025-07-18 ENCOUNTER — RESULT REVIEW (OUTPATIENT)
Age: 65
End: 2025-07-18

## 2025-07-18 ENCOUNTER — APPOINTMENT (OUTPATIENT)
Dept: CT IMAGING | Facility: IMAGING CENTER | Age: 65
End: 2025-07-18
Payer: MEDICARE

## 2025-07-18 DIAGNOSIS — S42.254G: ICD-10-CM

## 2025-07-18 PROCEDURE — 73200 CT UPPER EXTREMITY W/O DYE: CPT

## 2025-07-18 PROCEDURE — 76377 3D RENDER W/INTRP POSTPROCES: CPT

## 2025-07-18 PROCEDURE — 76377 3D RENDER W/INTRP POSTPROCES: CPT | Mod: 26

## 2025-07-18 PROCEDURE — 73200 CT UPPER EXTREMITY W/O DYE: CPT | Mod: 26,RT

## 2025-07-31 ENCOUNTER — APPOINTMENT (OUTPATIENT)
Dept: ORTHOPEDIC SURGERY | Facility: CLINIC | Age: 65
End: 2025-07-31

## 2025-08-04 ENCOUNTER — NON-APPOINTMENT (OUTPATIENT)
Age: 65
End: 2025-08-04

## 2025-08-07 ENCOUNTER — APPOINTMENT (OUTPATIENT)
Dept: OBGYN | Facility: CLINIC | Age: 65
End: 2025-08-07

## 2025-09-17 ENCOUNTER — EMERGENCY (EMERGENCY)
Facility: HOSPITAL | Age: 65
LOS: 1 days | End: 2025-09-17
Payer: MEDICARE

## 2025-09-17 VITALS
TEMPERATURE: 98 F | HEART RATE: 78 BPM | RESPIRATION RATE: 18 BRPM | HEIGHT: 64 IN | DIASTOLIC BLOOD PRESSURE: 82 MMHG | WEIGHT: 154.98 LBS | OXYGEN SATURATION: 99 % | SYSTOLIC BLOOD PRESSURE: 134 MMHG

## 2025-09-17 PROCEDURE — 93005 ELECTROCARDIOGRAM TRACING: CPT

## 2025-09-17 PROCEDURE — L9991: CPT

## 2025-09-17 PROCEDURE — 93010 ELECTROCARDIOGRAM REPORT: CPT
